# Patient Record
Sex: FEMALE | Race: BLACK OR AFRICAN AMERICAN | NOT HISPANIC OR LATINO | Employment: FULL TIME | URBAN - METROPOLITAN AREA
[De-identification: names, ages, dates, MRNs, and addresses within clinical notes are randomized per-mention and may not be internally consistent; named-entity substitution may affect disease eponyms.]

---

## 2017-01-09 ENCOUNTER — TELEPHONE (OUTPATIENT)
Dept: LACTATION | Facility: CLINIC | Age: 37
End: 2017-01-09

## 2017-01-09 NOTE — TELEPHONE ENCOUNTER
Lactation Note: patient called warmline with concern about baby's stools. States baby had meconium stool in the hospital and after discharge baby had not had any stools. Mother took baby to the pediatrician last Thursday (1/5/17), one week after discharge, and stated baby weighed 7-5 with sleeper, diaper, and onesie on. Birth weight was 6-14. Mother stated she expressed concern to doctor about baby's lack of stools and was advised to give the baby a glycerin suppository. After suppository given mother stated baby had a large green loose stool but has not stooled since then. Baby will be 2 weeks tomorrow. Baby has had no yellow stools since birth. She stated her milk came in, her breasts are full prior to feeding and softer after nursing, she hears the baby drinking and swallowing, he is content after nursing, and nurses at least 8 times in a 24 hour period. Reviewed with mother normal and expected stooling patterns - by day 5-6 baby should have 3-4 medium-large yellow seedy loose stools. Expressed concern to mother about baby's lack of stools and also suggested baby have true weight naked. Mother stated concern if baby is getting enough to eat.  Discussed pre and post feeding weight to determine if baby is transferring milk properly and reviewed signs of milk transfer. Mother has a pediatrician appointment at 3:00 pm today for follow up. Strongly encouraged patient to again express concerns to doctor and to call the warm line after appointment to report findings.

## 2017-01-10 ENCOUNTER — TELEPHONE (OUTPATIENT)
Dept: LACTATION | Facility: CLINIC | Age: 37
End: 2017-01-10

## 2017-01-11 ENCOUNTER — TELEPHONE (OUTPATIENT)
Dept: LACTATION | Facility: CLINIC | Age: 37
End: 2017-01-11

## 2017-01-11 NOTE — TELEPHONE ENCOUNTER
Lactation Note: Called mom to check on infant and breastfeeding. Mom states she took infant to Pediatrician on Monday (1/9) and expressed concern on the lack of bowel movement. Infant's weight in the Pediatrician's office was 7#5 oz with clothes. Mom states pediatrician ordered an x ray and told mom that nothing was wrong with baby's intestine. Infant had one bowel movement (yellow) on Monday after suppository use that was ordered by Pediatrician. Mom has been feeding infant at least 8 feedings a day. Infant will nurse on one side and mom has to stimulate infant to keep him awake during the feeding. Infant has had at least 5 to 6 wets in the last 24 hours and has not had any bowel movements since Monday. Mom also expressed concern on lack of breasts fullness. Agreed with mom that mom's milk supply might be diminishing. Also explained to mom that infant should have at this point 3 to 4 bowel movements a day. Based on information given, mom needs to work on increasing milk supply and watch for infant's output. Instructed mom to feed infant at least 8 or more in 24 hours. Offer both breasts. Mom to pump after feedings and feed infant pumped milk. Mom to watch for infant's output. Mom also needs to follow up with Pediatrician for a weight check, as infant needs to gain about 5 to 7 oz in a week. Mom verbalized understanding. Mom will call warm line for further questions or concerns.

## 2017-01-12 ENCOUNTER — TELEPHONE (OUTPATIENT)
Dept: LACTATION | Facility: CLINIC | Age: 37
End: 2017-01-12

## 2017-01-13 ENCOUNTER — TELEPHONE (OUTPATIENT)
Dept: LACTATION | Facility: CLINIC | Age: 37
End: 2017-01-13

## 2017-01-14 NOTE — TELEPHONE ENCOUNTER
"Follow up call. Pt states that she is pumping and bottle feeding. She will supplement with formula as needed. Pt gets one ounce with each pumping. Encouraged pt to pump 8-10 times per 24 hours. She is using a Medela PIS that she used for her other child, which she exclusively pumped for.   Suggested that she consider a new pump. Be sure all kit parts are intact.  suggests call her WIC office for potential breast pump , call Medicaid, Re need for pump.  Baby has now started stooling "on his own"   Encouraged mother to call  as needed.  "

## 2017-02-05 ENCOUNTER — HOSPITAL ENCOUNTER (EMERGENCY)
Facility: HOSPITAL | Age: 37
Discharge: HOME | End: 2017-02-05
Attending: EMERGENCY MEDICINE
Payer: COMMERCIAL

## 2017-02-05 VITALS
HEART RATE: 76 BPM | BODY MASS INDEX: 36.73 KG/M2 | HEIGHT: 69 IN | OXYGEN SATURATION: 99 % | TEMPERATURE: 99 F | DIASTOLIC BLOOD PRESSURE: 71 MMHG | SYSTOLIC BLOOD PRESSURE: 138 MMHG | WEIGHT: 248 LBS | RESPIRATION RATE: 18 BRPM

## 2017-02-05 DIAGNOSIS — B37.9 YEAST INFECTION: ICD-10-CM

## 2017-02-05 DIAGNOSIS — K59.00 CONSTIPATION, UNSPECIFIED CONSTIPATION TYPE: ICD-10-CM

## 2017-02-05 DIAGNOSIS — R30.0 DYSURIA: Primary | ICD-10-CM

## 2017-02-05 LAB
B-HCG UR QL: NEGATIVE
BACTERIA GENITAL QL WET PREP: ABNORMAL
BILIRUB UR QL STRIP: NEGATIVE
CLARITY UR REFRACT.AUTO: CLEAR
CLUE CELLS VAG QL WET PREP: ABNORMAL
COLOR UR AUTO: YELLOW
CTP QC/QA: YES
FILAMENT FUNGI VAG WET PREP-#/AREA: ABNORMAL
GLUCOSE UR QL STRIP: NEGATIVE
HGB UR QL STRIP: ABNORMAL
KETONES UR QL STRIP: NEGATIVE
LEUKOCYTE ESTERASE UR QL STRIP: ABNORMAL
MICROSCOPIC COMMENT: ABNORMAL
NITRITE UR QL STRIP: NEGATIVE
PH UR STRIP: 6 [PH] (ref 5–8)
PROT UR QL STRIP: NEGATIVE
RBC #/AREA URNS AUTO: 68 /HPF (ref 0–4)
SP GR UR STRIP: 1.01 (ref 1–1.03)
SPECIMEN SOURCE: ABNORMAL
SQUAMOUS #/AREA URNS AUTO: 4 /HPF
T VAGINALIS GENITAL QL WET PREP: ABNORMAL
URN SPEC COLLECT METH UR: ABNORMAL
UROBILINOGEN UR STRIP-ACNC: NEGATIVE EU/DL
WBC #/AREA URNS AUTO: 1 /HPF (ref 0–5)
WBC #/AREA VAG WET PREP: ABNORMAL
YEAST GENITAL QL WET PREP: ABNORMAL

## 2017-02-05 PROCEDURE — 87210 SMEAR WET MOUNT SALINE/INK: CPT

## 2017-02-05 PROCEDURE — 81025 URINE PREGNANCY TEST: CPT | Performed by: EMERGENCY MEDICINE

## 2017-02-05 PROCEDURE — 81001 URINALYSIS AUTO W/SCOPE: CPT

## 2017-02-05 PROCEDURE — 99284 EMERGENCY DEPT VISIT MOD MDM: CPT | Mod: ,,,

## 2017-02-05 PROCEDURE — 99284 EMERGENCY DEPT VISIT MOD MDM: CPT

## 2017-02-05 PROCEDURE — 87591 N.GONORRHOEAE DNA AMP PROB: CPT

## 2017-02-05 RX ORDER — FLUCONAZOLE 200 MG/1
200 TABLET ORAL DAILY
Qty: 7 TABLET | Refills: 0 | Status: SHIPPED | OUTPATIENT
Start: 2017-02-05 | End: 2017-02-09

## 2017-02-05 RX ORDER — HYDROCORTISONE 1 %
CREAM (GRAM) TOPICAL
Qty: 30 G | Refills: 0 | Status: SHIPPED | OUTPATIENT
Start: 2017-02-05 | End: 2018-10-21

## 2017-02-05 RX ORDER — CEPHALEXIN 500 MG/1
500 CAPSULE ORAL EVERY 12 HOURS
Qty: 14 CAPSULE | Refills: 0 | Status: SHIPPED | OUTPATIENT
Start: 2017-02-05 | End: 2017-02-09

## 2017-02-05 NOTE — ED AVS SNAPSHOT
[facility]  [address] [facility] [address]               Cesario Barrios   2017  8:27 PM   ED    Description:  Female : 1980   Department:  [facility]           Your Care was Coordinated By:     Provider Role From To    Daniella Villa MD Attending Provider 17    Lenard Garcia MD Attending Provider 17    Daniella Villa MD Attending Provider 17 --    Shani Goldstein PA-C Physician Assistant 17 --      Reason for Visit     Dysuria           Diagnoses this Visit        Comments    Dysuria    -  Primary     Constipation, unspecified constipation type         Yeast infection           ED Disposition     None           To Do List           Follow-up Information     Schedule an appointment as soon as possible for a visit with Yomi Cali MD.    Specialties:  Obstetrics, Obstetrics and Gynecology    Contact information:    [address] [place] [id number] [phone]         These Medications        Disp Refills Start End    cephALEXin (KEFLEX) 500 MG capsule 14 capsule 0 2017    Take 1 capsule (500 mg total) by mouth every 12 (twelve) hours. - Oral    [facility] [address] [place] [address]  [facility] Ph #: [phone]       hydrocortisone 1 % cream 30 g 0 2017 2/15/2017    Apply to affected area 2 times daily    [facility] [address] [place] [address] [facility] Ph #: [phone]       fluconazole (DIFLUCAN) 200 MG Tab 7 tablet 0 2017    Take 1 tablet (200 mg total) by mouth once daily. - Oral    Pharmacy: [facility] [address] [place] [address] [facility] Ph #: [phone]         [facility]     [facility] -  Assistance  Registered nurses  in the [facility] provide clinical advisement, health education, appointment booking, and other advisory services.  Call for this free service at [phone].             Medications           Message regarding Medications     Verify the changes and/or additions to your medication regime listed below are the same as discussed with your clinician today.  If any of these changes or additions are incorrect, please notify your healthcare provider.        START taking these NEW medications        Refills    cephALEXin (KEFLEX) 500 MG capsule 0    Sig: Take 1 capsule (500 mg total) by mouth every 12 (twelve) hours.    Class: Print    Route: Oral    hydrocortisone 1 % cream 0    Sig: Apply to affected area 2 times daily    Class: Print    fluconazole (DIFLUCAN) 200 MG Tab 0    Sig: Take 1 tablet (200 mg total) by mouth once daily.    Class: Print    Route: Oral           Verify that the below list of medications is an accurate representation of the medications you are currently taking.  If none reported, the list may be blank. If incorrect, please contact your healthcare provider. Carry this list with you in case of emergency.           Current Medications     cephALEXin (KEFLEX) 500 MG capsule Take 1 capsule (500 mg total) by mouth every 12 (twelve) hours.    fluconazole (DIFLUCAN) 200 MG Tab Take 1 tablet (200 mg total) by mouth once daily.    hydrocortisone 1 % cream Apply to affected area 2 times daily    ibuprofen (ADVIL,MOTRIN) 600 MG tablet Take 1 tablet (600 mg total) by mouth every 6 (six) hours.    oxycodone-acetaminophen (PERCOCET) 5-325 mg per tablet Take 1 tablet by mouth every 4 (four) hours as needed.    PNV64-iron cbn&gluc-FA-DSS-dha (CITRANATAL 90 DHA, NEW FORMULA) 90-1- mg Cmpk Take 1 tablet by mouth once daily.           Clinical Reference Information           Your Vitals Were     BP Pulse Temp Resp Height Weight    138/71 (BP Location: Right arm, Patient Position: Sitting) 76 99 °F  "(37.2 °C) (Oral) 18 5' 9" (1.753 m) 112.5 kg (248 lb)    Last Period SpO2 BMI          03/19/2016 (Approximate) 99% 36.62 kg/m2        Allergies as of 2/5/2017     No Known Allergies      Immunizations Administered on Date of Encounter - 2/5/2017     None      ED Micro, Lab, POCT     Start Ordered       Status Ordering Provider    02/05/17 2041 02/05/17 2040  Vaginal Screen Vagina  STAT      Final result     02/05/17 2041 02/05/17 2040  C. trachomatis/N. gonorrhoeae by AMP DNA Cervix  STAT      In process     02/05/17 2017 02/05/17 2016  POCT urine pregnancy  Once      Final result     02/05/17 2017 02/05/17 2016  Urine culture  Add-on      Completed     02/05/17 2016 02/05/17 2015  Urinalysis  STAT      Final result     02/05/17 2015 02/05/17 2015  Urinalysis Microscopic  Once      Final result       ED Imaging Orders     None        Discharge Instructions         Constipation (Adult)  Constipation means that you have bowel movements that are less frequent than usual. Stools often become very hard and difficult to pass.  Constipation is very common. At some point in life it affects almost everyone. Since everyone's bowel habits are different, what is constipation to one person may not be to another. Your healthcare provider may do tests to diagnose constipation. It depends on what he or she finds when evaluating you.    Symptoms of constipation include:  · Abdominal pain  · Bloating  · Vomiting  · Painful bowel movements  · Itching, swelling, bleeding, or pain around the anus  Causes  Constipation can have many causes. These include:  · Diet low in fiber  · Too much dairy  · Not drinking enough liquids  · Lack of exercise or physical activity. This is especially true for older adults.  · Changes in lifestyle or daily routine, including pregnancy, aging, work, and travel  · Frequent use or misuse of laxatives  · Ignoring the urge to have a bowel movement or delaying it until later  · Medicines, such as certain " prescription pain medicines, iron supplements, antacids, certain antidepressants, and calcium supplements  · Diseases like irritable bowel syndrome, bowel obstructions, stroke, diabetes, thyroid disease, Parkinson disease, hemorrhoids, and colon cancer  Complications  Potential complications of constipation can include:  · Hemorrhoids  · Rectal bleeding from hemorrhoids or anal fissures (skin tears)  · Hernias  · Dependency on laxatives  · Chronic constipation  · Fecal impaction  · Bowel obstruction or perforation  Home care  All treatment should be done after talking with your healthcare provider. This is especially true if you have another medical problems, are taking prescription medicines, or are an older adult. Treatment most often involves lifestyle changes. You may also need medicines. Your healthcare provider will tell you which will work best for you. Follow the advice below to help avoid this problem in the future.  Lifestyle changes  These lifestyle changes can help prevent constipation:  · Diet. Eat a high-fiber diet, with fresh fruit and vegetables, and reduce dairy intake, meats, and processed foods  · Fluids. It's important to get enough fluids each day. Drink plenty of water when you eat more fiber. If you are on diet that limits the amount of fluid you can have, talk about this with your healthcare provider.  · Regular exercise. Check with your healthcare provider first.  Medications  Take any medicines as directed. Some laxatives are safe to use only every now and then. Others can be taken on a regular basis. Talk with your doctor or pharmacist if you have questions.  Prescription pain medicines can cause constipation. If you are taking this kind of medicine, ask your healthcare provider if you should also take a stool softener.  Medicines you may take to treat constipation include:  · Fiber supplements  · Stool softeners  · Laxatives  · Enemas  · Rectal suppositories  Follow-up care  Follow up  with your healthcare provider if symptoms don't get better in the next few days. You may need to have more tests or see a specialist.  Call 911  Call 911 if any of these occur:  · Trouble breathing  · Stiff, rigid abdomen that is severely painful to touch  · Confusion  · Fainting or loss of consciousness  · Rapid heart rate  · Chest pain  When to seek medical advice  Call your healthcare provider right away if any of these occur:  · Fever over 100.4°F (38°C)  · Failure to resume normal bowel movements  · Pain in your abdomen or back gets worse  · Nausea or vomiting  · Swelling in your abdomen  · Blood in the stool  · Black, tarry stool  · Involuntary weight loss  · Weakness  Date Last Reviewed: 12/30/2015 © 2000-2016 [employer]. [address] [place] [address], [place] [address]. All rights reserved. This information is not intended as a substitute for professional medical care. Always follow your healthcare professional's instructions.          Eating a High-Fiber Diet  Fiber is what gives strength and structure to plants. Most grains, beans, vegetables, and fruits contain fiber. Foods rich in fiber are often low in calories and fat, and they fill you up more. They may also reduce your risks for certain health problems. To find out the amount of fiber in canned, packaged, or frozen foods, read the Nutrition Facts label. It tells you how much fiber is in a serving.    Types of fiber and their benefits  There are two types of fiber: insoluble and soluble. They both aid digestion and help you maintain a healthy weight.  · Insoluble fiber. This is found in whole grains, cereals, certain fruits and vegetables such as apple skin, corn, and carrots. Insoluble fiber may prevent constipation and reduce the risk for certain types of cancer.  · Soluble fiber. This type of fiber is in oats, beans, and certain fruits and vegetables such as strawberries and peas. Soluble fiber can reduce cholesterol, which may help lower  the risk for heart disease. It also helps control blood sugar levels.  Look for high-fiber foods  Try these foods to add fiber to your diet:  · Whole-grain breads and cereals. Try to eat 6 to 8 ounces a day. Include wheat and oat bran cereals, whole-wheat muffins or toast, and corn tortillas in your meals.  · Fruits. Try to eat 2 cups a day. Apples, oranges, strawberries, pears, and bananas are good sources. (Note: Fruit juice is low in fiber.)  · Vegetables. Try to eat at least 2.5 cups a day. Add asparagus, carrots, broccoli, peas, and corn to your meals.  · Beans. One cup of cooked lentils gives you over 15 grams of fiber. Try navy beans, lentils, and chickpeas.  · Seeds. A small handful of seeds gives you about 3 grams of fiber. Try sunflower seeds.  Keep track of your fiber  Keep track of how much fiber you eat. Start by reading food labels. Then eat a variety of foods high in fiber. As you begin to eat more fiber, ask your healthcare provider how much water you should be drinking to keep your digestive system working smoothly.  You should aim for a certain amount of fiber in your diet each day. If you are a woman, that amount is between 25 and 28 grams per day. Men should aim for 30 to 33 grams per day. After age 50, your daily fiber needs drop to 22 grams for women and 28 grams for men.  Before you reach for the fiber supplements, think about this. Fiber is found naturally in healthy whole foods. It gives you that feeling of fullness after you eat. Taking fiber supplements or eating fiber-enriched foods will not give you this full feeling.  Your fiber intake is a good measure for the quality of your overall diet. If you are missing out on your daily amount of fiber, you may be lacking other important nutrients as well.  Date Last Reviewed: 5/11/2015  © 8212-7515 [employer]. [address] [place] [address], [place] [address]. All rights reserved. This information is not intended as a substitute for  "professional medical care. Always follow your healthcare professional's instructions.          Dysuria     Painful urination (dysuria) is often caused by a problem in the urinary tract.   Dysuria is pain felt during urination. It is often described as a burning. Learn more about this problem and how it can be treated.  What causes dysuria?  Possible causes include:  · Infection with a bacteria or virus. This can be a urinary tract infection (UTI). Or it may be a sexually transmitted infection (STI).  · Sensitivity or allergy to chemicals. These chemicals are found in lotions and other products.  · Prostate or bladder problems  · Radiation therapy to the pelvic area  How is dysuria diagnosed?  Your healthcare provider will examine you. He or she will ask about your symptoms and health. After talking with you and doing a physical exam, your healthcare provider may know what is causing your dysuria. He or she will usually request  a sample of your urine. Tests of your urine, or a "urinalysis," are done. A urinalysis may include:  · Looking at the urine sample (visual exam)  · Checking for substances (chemical exam)  · Checking a small amount under a microscope (microscopic exam)  Some parts of the urinalysis may be done in the provider's office and some in a lab. And, the urine sample may be checked for bacteria and yeast (urine culture). Your healthcare provider will tell you more about these tests if they are needed.  How is dysuria treated?  Treatment depends on the cause. If you have a bacterial infection, you may need antibiotics. You may be given medications to make it easier for you to urinate and help relieve pain. Your healthcare provider can tell you more about your treatment options. Untreated, symptoms may get worse.  Call the healthcare provider right away if you have any of the following:  · Fever of 100.4°F (38°C) or higher   · No improvement after three days of treatment  · Trouble urinating because of " pain  · New or increased discharge from the vagina or penis  · Rash or joint pain  · Increased back or abdominal pain  · Enlarged painful lymph nodes (lumps) in the groin   Date Last Reviewed: 9/24/2014  © 3687-2042 [employer]. [address] [place] [address]. All rights reserved. This information is not intended as a substitute for professional medical care. Always follow your healthcare professional's instructions.          Anatomy of the Female Urinary Tract  Your urinary tract helps get rid of urine (your bodys liquid waste). The kidneys collect chemicals and water your body doesnt need. This is turned into urine. Urine travels out of the kidneys through the ureters to the bladder. The bladder holds urine until youre ready to release it. The urethra carries urine from the bladder out of the body. The main sphincter muscle circles the mid-urethra.      Front view of female urinary tract.     Date Last Reviewed: 8/27/2014  © 9126-9852 [employer]. [address] [place] [address]. All rights reserved. This information is not intended as a substitute for professional medical care. Always follow your healthcare professional's instructions.          Discharge References/Attachments     VAGINAL INFECTION: YEAST (CANDIDIASIS) (ENGLISH)      Your Scheduled Appointments     Feb 09, 2017 11:00 AM CST   Post Partum with Jocy Tejdaa MD   [facility] [address] [facility]    [address] [place] [id number]   [phone]               [facility] complies with applicable Federal civil rights laws and does not discriminate on the basis of race, color, national origin, age, disability, or sex.        Language Assistance Services     ATTENTION: Language assistance services are available, free of charge. Please call [phone].      ATENCIÓN: Si habla español, tiene a flores disposición servicios gratuitos de asistencia  lingüística. [facility] [phone].     HERNÁN Ý: N?u b?n nói Ti?ng Vi?t, có các d?ch v? h? tr? ngôn ng? mi?n phí dành cho b?n. G?i s? [phone].

## 2017-02-06 NOTE — ED TRIAGE NOTES
Pt delivered a child 5 weeks ago. Pt complains of constipation x4 days and burning during urination. Pt denies flu-like symptoms.

## 2017-02-06 NOTE — ED PROVIDER NOTES
Encounter Date: 2017    SCRIBE #1 NOTE: I, Trish Valdez, am scribing for, and in the presence of,  Dr. Villa. I have scribed the following portions of the note - the APC attestation.       History     Chief Complaint   Patient presents with    Dysuria     Pt reports delivering baby 5 weeks ago and now has painful urination. Denies blood. Also c/o constipation.     Review of patient's allergies indicates:  No Known Allergies  HPI Comments: 37yo AAF with medical history of anemia and chronic UTIs presents to ED complaining of burning with urination. Patient symptoms began 1 hour ago. She also noticed white and clear discharge when wiping. Patient reports constipation for 3 days. Has not tried anything at home, admits to poor diet. Patient denies nausea, vomiting and able to tolerate po intake. Patient denies fever, chills, nausea, vomit, chest pain, shortness of breath, abdominal pain, hematuria, changes in bowel, paresthesias.    The history is provided by the patient.     Past Medical History   Diagnosis Date    Anemia      borderline     Benign essential hypertension antepartum 2014    UTI (lower urinary tract infection)      Past Medical History Pertinent Negatives   Diagnosis Date Noted    Diabetes mellitus 5/3/2016    Seizures 5/3/2016     Past Surgical History   Procedure Laterality Date    Broken finger       section       Family History   Problem Relation Age of Onset    COPD Neg Hx     Drug abuse Neg Hx     Kidney disease Neg Hx     Learning disabilities Neg Hx     Mental illness Neg Hx     Breast cancer Neg Hx     Colon cancer Neg Hx     Ovarian cancer Neg Hx      Social History   Substance Use Topics    Smoking status: Never Smoker    Smokeless tobacco: None    Alcohol use No     Review of Systems   Constitutional: Negative for chills, fatigue and fever.   HENT: Negative for congestion, ear discharge and sore throat.    Eyes: Negative for visual disturbance.    Respiratory: Negative for cough, chest tightness and shortness of breath.    Cardiovascular: Negative for chest pain.   Gastrointestinal: Positive for blood in stool and constipation. Negative for abdominal pain, diarrhea, nausea and vomiting.   Genitourinary: Positive for dysuria and vaginal discharge. Negative for flank pain, frequency, hematuria and vaginal bleeding.   Musculoskeletal: Negative for back pain and neck pain.   Skin: Negative for rash.   Neurological: Negative for dizziness, syncope, weakness, light-headedness and headaches.   Psychiatric/Behavioral: The patient is nervous/anxious.        Physical Exam   Initial Vitals   BP Pulse Resp Temp SpO2   02/05/17 1959 02/05/17 1959 02/05/17 1959 02/05/17 1959 02/05/17 1959   135/89 84 16 98.3 °F (36.8 °C) 96 %     Physical Exam    Vitals reviewed.  Constitutional: Vital signs are normal. She appears well-developed.   HENT:   Head: Normocephalic and atraumatic.   Nose: Nose normal.   Eyes: Conjunctivae and lids are normal. Pupils are equal, round, and reactive to light. Lids are everted and swept, no foreign bodies found.   Neck: Trachea normal and normal range of motion. Neck supple.   Cardiovascular: Normal rate, regular rhythm, normal heart sounds, intact distal pulses and normal pulses.   No murmur heard.  Pulmonary/Chest: Breath sounds normal. No respiratory distress. She has no wheezes. She exhibits no tenderness.   Abdominal: Soft. Normal appearance and bowel sounds are normal. She exhibits no mass. There is no tenderness. There is no rebound.   Genitourinary: Rectal exam shows external hemorrhoid. Rectal exam shows no internal hemorrhoid, no fissure and no tenderness. Pelvic exam was performed with patient supine. No labial fusion. There is no rash, tenderness or lesion on the right labia. There is no rash, tenderness or lesion on the left labia. Cervix exhibits no motion tenderness and no friability. Right adnexum displays no mass, no tenderness  and no fullness. Left adnexum displays no mass, no tenderness and no fullness. Vaginal discharge found.   Neurological: She is alert. No sensory deficit.   Skin: Skin is warm and dry. No cyanosis.   Psychiatric: She has a normal mood and affect.         ED Course   Procedures  Labs Reviewed   URINALYSIS - Abnormal; Notable for the following:        Result Value    Occult Blood UA 3+ (*)     Leukocytes, UA Trace (*)     All other components within normal limits   VAGINAL SCREEN - Abnormal; Notable for the following:     Budding Yeast Rare (*)     Bacteria - Vaginal Screen Moderate (*)     All other components within normal limits   URINALYSIS MICROSCOPIC - Abnormal; Notable for the following:     RBC, UA 68 (*)     All other components within normal limits   CULTURE, URINE   C. TRACHOMATIS/N. GONORRHOEAE BY AMP DNA   POCT URINE PREGNANCY             Medical Decision Making:   History:   Old Medical Records: I decided to obtain old medical records.  Old Records Summarized: records from clinic visits.  Clinical Tests:   Lab Tests: Ordered and Reviewed       APC / Resident Notes:   37yo AAF with medical historyof anemia and chronic UTIs presents to ED complaining of burning with urination. Cardiac examination reveals regular rate and rhythm with no murmurs, lifts, heaves or thrills. Lungs clear bilaterally on auscultation, no wheezing. Abdomen is soft, non tender, non distended with normal bowel sounds x4. On vaginal exam, scant amount of white discharge noted in vagina. No CMT, no friability. On MICAH, internal hemorrhoid felt, no anal fissure, no active bleeding. Distal pulses intact. Sensory intact. Cap refill < 2 seconds. Vitals stable. Patient is in no acute distress.     While patient was in bathroom for urine, she had a hard bowel movement with trace amount of blood on tissue.     Labs reviewed. UA showed trace leukocytes, nitrite negative. UPT negative. Vaginal screen showed rare budding yeast.     DDX include but  not limited to UTI, constipation, internal hemorrhoid, STI, yeast infection.    Patient discharged home with Keflex 500mg, hydrocortisone cream 1%, diflucan 200mg and advised to use milk of magnesia or Miralax for constipation and softer stools. Advised to follow up with PCP. ED precautions given. Patient stable at discharge.     I have discussed and reviewed with my supervising physician.       Scribe Attestation:   Scribe #1: I performed the above scribed service and the documentation accurately describes the services I performed. I attest to the accuracy of the note.    Attending Attestation:     Physician Attestation Statement for NP/PA:   I discussed this assessment and plan of this patient with the NP/PA, but I did not personally examine the patient. The face to face encounter was performed by the NP/PA.    Other NP/PA Attestation Additions:    History of Present Illness: Dysuria         Physician Attestation for Scribe:  Physician Attestation Statement for Scribe #1: I, Dr. Villa, reviewed documentation, as scribed by Trish Valdez in my presence, and it is both accurate and complete.                 ED Course     Clinical Impression:   The primary encounter diagnosis was Dysuria. Diagnoses of Constipation, unspecified constipation type and Yeast infection were also pertinent to this visit.    Disposition:   Disposition: Discharged  Condition: Stable       Shani Goldstein PA-C  02/05/17 [id number]

## 2017-02-06 NOTE — DISCHARGE INSTRUCTIONS
Constipation (Adult)  Constipation means that you have bowel movements that are less frequent than usual. Stools often become very hard and difficult to pass.  Constipation is very common. At some point in life it affects almost everyone. Since everyone's bowel habits are different, what is constipation to one person may not be to another. Your healthcare provider may do tests to diagnose constipation. It depends on what he or she finds when evaluating you.    Symptoms of constipation include:  · Abdominal pain  · Bloating  · Vomiting  · Painful bowel movements  · Itching, swelling, bleeding, or pain around the anus  Causes  Constipation can have many causes. These include:  · Diet low in fiber  · Too much dairy  · Not drinking enough liquids  · Lack of exercise or physical activity. This is especially true for older adults.  · Changes in lifestyle or daily routine, including pregnancy, aging, work, and travel  · Frequent use or misuse of laxatives  · Ignoring the urge to have a bowel movement or delaying it until later  · Medicines, such as certain prescription pain medicines, iron supplements, antacids, certain antidepressants, and calcium supplements  · Diseases like irritable bowel syndrome, bowel obstructions, stroke, diabetes, thyroid disease, Parkinson disease, hemorrhoids, and colon cancer  Complications  Potential complications of constipation can include:  · Hemorrhoids  · Rectal bleeding from hemorrhoids or anal fissures (skin tears)  · Hernias  · Dependency on laxatives  · Chronic constipation  · Fecal impaction  · Bowel obstruction or perforation  Home care  All treatment should be done after talking with your healthcare provider. This is especially true if you have another medical problems, are taking prescription medicines, or are an older adult. Treatment most often involves lifestyle changes. You may also need medicines. Your healthcare provider will tell you which will work best for you. Follow  the advice below to help avoid this problem in the future.  Lifestyle changes  These lifestyle changes can help prevent constipation:  · Diet. Eat a high-fiber diet, with fresh fruit and vegetables, and reduce dairy intake, meats, and processed foods  · Fluids. It's important to get enough fluids each day. Drink plenty of water when you eat more fiber. If you are on diet that limits the amount of fluid you can have, talk about this with your healthcare provider.  · Regular exercise. Check with your healthcare provider first.  Medications  Take any medicines as directed. Some laxatives are safe to use only every now and then. Others can be taken on a regular basis. Talk with your doctor or pharmacist if you have questions.  Prescription pain medicines can cause constipation. If you are taking this kind of medicine, ask your healthcare provider if you should also take a stool softener.  Medicines you may take to treat constipation include:  · Fiber supplements  · Stool softeners  · Laxatives  · Enemas  · Rectal suppositories  Follow-up care  Follow up with your healthcare provider if symptoms don't get better in the next few days. You may need to have more tests or see a specialist.  Call 911  Call 911 if any of these occur:  · Trouble breathing  · Stiff, rigid abdomen that is severely painful to touch  · Confusion  · Fainting or loss of consciousness  · Rapid heart rate  · Chest pain  When to seek medical advice  Call your healthcare provider right away if any of these occur:  · Fever over 100.4°F (38°C)  · Failure to resume normal bowel movements  · Pain in your abdomen or back gets worse  · Nausea or vomiting  · Swelling in your abdomen  · Blood in the stool  · Black, tarry stool  · Involuntary weight loss  · Weakness  Date Last Reviewed: 12/30/2015  © 6525-1752 [employer]. [address] [place] [address], [place] [address]. All rights reserved. This information is not intended as a substitute for  professional medical care. Always follow your healthcare professional's instructions.          Eating a High-Fiber Diet  Fiber is what gives strength and structure to plants. Most grains, beans, vegetables, and fruits contain fiber. Foods rich in fiber are often low in calories and fat, and they fill you up more. They may also reduce your risks for certain health problems. To find out the amount of fiber in canned, packaged, or frozen foods, read the Nutrition Facts label. It tells you how much fiber is in a serving.    Types of fiber and their benefits  There are two types of fiber: insoluble and soluble. They both aid digestion and help you maintain a healthy weight.  · Insoluble fiber. This is found in whole grains, cereals, certain fruits and vegetables such as apple skin, corn, and carrots. Insoluble fiber may prevent constipation and reduce the risk for certain types of cancer.  · Soluble fiber. This type of fiber is in oats, beans, and certain fruits and vegetables such as strawberries and peas. Soluble fiber can reduce cholesterol, which may help lower the risk for heart disease. It also helps control blood sugar levels.  Look for high-fiber foods  Try these foods to add fiber to your diet:  · Whole-grain breads and cereals. Try to eat 6 to 8 ounces a day. Include wheat and oat bran cereals, whole-wheat muffins or toast, and corn tortillas in your meals.  · Fruits. Try to eat 2 cups a day. Apples, oranges, strawberries, pears, and bananas are good sources. (Note: Fruit juice is low in fiber.)  · Vegetables. Try to eat at least 2.5 cups a day. Add asparagus, carrots, broccoli, peas, and corn to your meals.  · Beans. One cup of cooked lentils gives you over 15 grams of fiber. Try navy beans, lentils, and chickpeas.  · Seeds. A small handful of seeds gives you about 3 grams of fiber. Try sunflower seeds.  Keep track of your fiber  Keep track of how much fiber you eat. Start by reading food labels. Then eat a  variety of foods high in fiber. As you begin to eat more fiber, ask your healthcare provider how much water you should be drinking to keep your digestive system working smoothly.  You should aim for a certain amount of fiber in your diet each day. If you are a woman, that amount is between 25 and 28 grams per day. Men should aim for 30 to 33 grams per day. After age 50, your daily fiber needs drop to 22 grams for women and 28 grams for men.  Before you reach for the fiber supplements, think about this. Fiber is found naturally in healthy whole foods. It gives you that feeling of fullness after you eat. Taking fiber supplements or eating fiber-enriched foods will not give you this full feeling.  Your fiber intake is a good measure for the quality of your overall diet. If you are missing out on your daily amount of fiber, you may be lacking other important nutrients as well.  Date Last Reviewed: 5/11/2015  © 5488-5482 [employer]. [address] [place] [address]. All rights reserved. This information is not intended as a substitute for professional medical care. Always follow your healthcare professional's instructions.          Dysuria     Painful urination (dysuria) is often caused by a problem in the urinary tract.   Dysuria is pain felt during urination. It is often described as a burning. Learn more about this problem and how it can be treated.  What causes dysuria?  Possible causes include:  · Infection with a bacteria or virus. This can be a urinary tract infection (UTI). Or it may be a sexually transmitted infection (STI).  · Sensitivity or allergy to chemicals. These chemicals are found in lotions and other products.  · Prostate or bladder problems  · Radiation therapy to the pelvic area  How is dysuria diagnosed?  Your healthcare provider will examine you. He or she will ask about your symptoms and health. After talking with you and doing a physical exam, your healthcare provider may  "know what is causing your dysuria. He or she will usually request  a sample of your urine. Tests of your urine, or a "urinalysis," are done. A urinalysis may include:  · Looking at the urine sample (visual exam)  · Checking for substances (chemical exam)  · Checking a small amount under a microscope (microscopic exam)  Some parts of the urinalysis may be done in the provider's office and some in a lab. And, the urine sample may be checked for bacteria and yeast (urine culture). Your healthcare provider will tell you more about these tests if they are needed.  How is dysuria treated?  Treatment depends on the cause. If you have a bacterial infection, you may need antibiotics. You may be given medications to make it easier for you to urinate and help relieve pain. Your healthcare provider can tell you more about your treatment options. Untreated, symptoms may get worse.  Call the healthcare provider right away if you have any of the following:  · Fever of 100.4°F (38°C) or higher   · No improvement after three days of treatment  · Trouble urinating because of pain  · New or increased discharge from the vagina or penis  · Rash or joint pain  · Increased back or abdominal pain  · Enlarged painful lymph nodes (lumps) in the groin   Date Last Reviewed: 9/24/2014  © 4558-3519 [employer]. [address] [place] [address]. All rights reserved. This information is not intended as a substitute for professional medical care. Always follow your healthcare professional's instructions.          Anatomy of the Female Urinary Tract  Your urinary tract helps get rid of urine (your bodys liquid waste). The kidneys collect chemicals and water your body doesnt need. This is turned into urine. Urine travels out of the kidneys through the ureters to the bladder. The bladder holds urine until youre ready to release it. The urethra carries urine from the bladder out of the body. The main sphincter muscle circles " the mid-urethra.      Front view of female urinary tract.     Date Last Reviewed: 8/27/2014  © 7875-9663 [employer]. [address] [place] [address], [place] [address]. All rights reserved. This information is not intended as a substitute for professional medical care. Always follow your healthcare professional's instructions.

## 2017-02-06 NOTE — ED NOTES
Patient identifiers verified and correct for Cesario Barrios.   LOC: The patient is awake, alert and aware of environment with an appropriate affect, the patient is oriented x 3 and speaking appropriately.   APPEARANCE: Patient appears comfortable and in no acute distress, patient is clean and well groomed.  SKIN: The skin is warm and dry, color consistent with ethnicity, patient has normal skin turgor and moist mucus membranes, skin intact, no breakdown or bruising noted.   MUSCULOSKELETAL: Patient moving all extremities spontaneously, no swelling noted.  RESPIRATORY: Airway is open and patent, respirations are spontaneous, patient has a normal effort and rate, no accessory muscle use noted, pt placed on continuous pulse ox with O2 sats noted at 97% on room air.  CARDIAC: Pt placed on cardiac monitor. Patient has a normal rate and regular rhythm, no edema noted, capillary refill < 3 seconds.   GASTRO: Soft and non tender to palpation, no distention noted, normoactive bowel sounds present in all four quadrants.   : Pt denies any pain or frequency with urination.  NEURO: Pt opens eyes spontaneously, behavior appropriate to situation, follows commands, facial expression symmetrical, bilateral hand grasp equal and even, purposeful motor response noted, normal sensation in all extremities when touched with a finger.

## 2017-02-07 LAB
C TRACH DNA SPEC QL NAA+PROBE: NEGATIVE
N GONORRHOEA DNA SPEC QL NAA+PROBE: NEGATIVE

## 2017-02-09 ENCOUNTER — POSTPARTUM VISIT (OUTPATIENT)
Dept: OBSTETRICS AND GYNECOLOGY | Facility: CLINIC | Age: 37
End: 2017-02-09
Payer: COMMERCIAL

## 2017-02-09 VITALS — DIASTOLIC BLOOD PRESSURE: 70 MMHG | SYSTOLIC BLOOD PRESSURE: 120 MMHG | BODY MASS INDEX: 33.37 KG/M2 | WEIGHT: 226 LBS

## 2017-02-09 DIAGNOSIS — Z30.430 ENCOUNTER FOR INSERTION OF INTRAUTERINE CONTRACEPTIVE DEVICE: ICD-10-CM

## 2017-02-09 PROCEDURE — 0503F POSTPARTUM CARE VISIT: CPT | Mod: S$GLB,,, | Performed by: OBSTETRICS & GYNECOLOGY

## 2017-02-09 PROCEDURE — 58300 INSERT INTRAUTERINE DEVICE: CPT | Mod: PBBFAC | Performed by: OBSTETRICS & GYNECOLOGY

## 2017-02-09 PROCEDURE — 99212 OFFICE O/P EST SF 10 MIN: CPT | Mod: PBBFAC | Performed by: OBSTETRICS & GYNECOLOGY

## 2017-02-09 PROCEDURE — 58300 INSERT INTRAUTERINE DEVICE: CPT | Mod: S$GLB,,, | Performed by: OBSTETRICS & GYNECOLOGY

## 2017-02-09 PROCEDURE — 99999 PR PBB SHADOW E&M-EST. PATIENT-LVL II: CPT | Mod: PBBFAC,,, | Performed by: OBSTETRICS & GYNECOLOGY

## 2017-02-09 RX ADMIN — LEVONORGESTREL 1 EACH: 52 INTRAUTERINE DEVICE INTRAUTERINE at 04:02

## 2017-02-09 NOTE — MR AVS SNAPSHOT
[facility] [address]  [address]  [facility] [address]  Phone: [phone]  Fax: [phone]                  Cesario Barrios   2017 11:00 AM   Postpartum Visit    Description:  Female : 1980   Provider:  Jocy Tejada MD   Department:  [facility] [address]           Reason for Visit     Procedure                To Do List           Goals (5 Years of Data)     None      [facility]     [facility] -  Assistance  Registered nurses in the [facility] Call Center provide clinical advisement, health education, appointment booking, and other advisory services.  Call for this free service at [phone].             Medications           Message regarding Medications     Verify the changes and/or additions to your medication regime listed below are the same as discussed with your clinician today.  If any of these changes or additions are incorrect, please notify your healthcare provider.        STOP taking these medications     cephALEXin (KEFLEX) 500 MG capsule Take 1 capsule (500 mg total) by mouth every 12 (twelve) hours.    fluconazole (DIFLUCAN) 200 MG Tab Take 1 tablet (200 mg total) by mouth once daily.    ibuprofen (ADVIL,MOTRIN) 600 MG tablet Take 1 tablet (600 mg total) by mouth every 6 (six) hours.    oxycodone-acetaminophen (PERCOCET) 5-325 mg per tablet Take 1 tablet by mouth every 4 (four) hours as needed.           Verify that the below list of medications is an accurate representation of the medications you are currently taking.  If none reported, the list may be blank. If incorrect, please contact your healthcare provider. Carry this list with you in case of emergency.           Current Medications     hydrocortisone 1 % cream Apply to affected area 2 times daily    PNV64-iron cbn&gluc-FA-DSS-dha (CITRANATAL 90 DHA, NEW FORMULA) 90-1- mg Cmpk Take 1 tablet by mouth once daily.           Clinical Reference  Information           Prenatal Vitals     Enc. Date GA Prenatal Vitals Prenatal Pulse Pain Level Urine Albumin/Glucose Edema Presentation Dilation/Effacement/Station    17 40w1d 120/70 / 102.5 kg (225 lb 15.5 oz)           16 40w1d Admission Dx: Normal labor Dept: [facility]    16 40w0d Admission Dept: [facility] OB ER    16 39w1d 124/72 / 113 kg (249 lb 1.9 oz)  / present / Present  0 Negative / Negative None / None / None   60 / -3    16 38w0d 122/72 / 110.8 kg (244 lb 4.3 oz)  / +++ / Present  3  None / None / None / No   / -3    16 37w0d 122/80 / 113.3 kg (249 lb 12.5 oz)  / +++ / Present  3 Negative / Negative None / None / None / No      16 36w1d 120/74 / 113.4 kg (250 lb) 37 cm / 140 / Present  0 Negative / Negative None / None / None / No  0    16 33w1d 112/79 / 113.4 kg (250 lb)  / +++ / Present  0 Trace / Negative None / None / None / No      10/20/16 30w3d 108/72 / 113.5 kg (250 lb 3.6 oz) 33 cm / 145 / Present  0 Negative / Negative None / None / None / No      10/16/16 29w6d Admission Dx: Decreased fetal movement Dept: [facility] OB ASSE    16 27w3d 120/72 / 113.3 kg (249 lb 12.5 oz)  / +++  0 Negative / Negative None / None / None / No      16 24w3d 120/70 / 112.6 kg (248 lb 3.8 oz)  / +++ / Present  0 Negative / Negative None / None / None / No      16 22w1d 112/72 / 111.1 kg (244 lb 14.9 oz)           16 20w3d 110/76 / 110.6 kg (243 lb 13.3 oz)  / +++ / Present  3* Negative / Negative None / None / None / No                     *Pain Level:  mild cramps    16 19w1d 112/80 / 111.2 kg (245 lb 2.4 oz)           16 17w5d Admission Dx: Hx of  delivery, currently pregnant Dept: [facility] L&D    16 16w3d 108/80 / 111.6 kg (246 lb 0.5 oz)  / +++ / Absent  0 Negative / Negative None / None / None / No      16 16w1d 122/72 / 110.1 kg (242 lb 11.6 oz)           16 12w0d 118/76 / 110 kg (242 lb 8.1 oz)   0 Negative / Negative  "None / None / None / No      16 6w3d 114/70 / 111.3 kg (245 lb 6 oz)  /  / Absent  0 Negative / Negative None / None / None / No         TW.814 kg (4 lb)   Pregravid weight: 111.1 kg (245 lb)   Number of babies: 1   Height: 5' 9" (1.753 m)   BMI: 36.2       Your Vitals Were     BP Weight Last Period BMI       120/70 102.5 kg (225 lb 15.5 oz) 2016 (Approximate) 33.37 kg/m2       Allergies as of 2017     No Known Allergies      Immunizations Administered on Date of Encounter - 2017     None      Language Assistance Services     ATTENTION: Language assistance services are available, free of charge. Please call [phone].      ATENCIÓN: Si alessandrola vesta, tiene a flores disposición servicios gratuitos de asistencia lingüística. Llame al [phone].     CHÚ Ý: N?u b?n nói Ti?ng Vi?t, có các d?ch v? h? tr? ngôn ng? mi?n phí dành cho b?n. G?i s? [phone].         [facility] [address] complies with applicable Federal civil rights laws and does not discriminate on the basis of race, color, national origin, age, disability, or sex.        "

## 2017-02-09 NOTE — PROGRESS NOTES
CC: Post-partum follow-up    Cesario Barrios is a 36 y.o. female  presents for post-partum visit s/p a .    Delivery Date: 2016  Delivery MD: Jocy Tejada  Gender: male  Birth Weight: 6 pounds 13 ounces  Breast Feeding: NO  Depression: NO  Contraception: IUD    Pregnancy was complicated by: hx PTL/PPROM on progesterone injections.       Visit Vitals    /70    Wt 102.5 kg (225 lb 15.5 oz)    LMP 2016 (Approximate)    BMI 33.37 kg/m2       ROS:  GENERAL: No fever, chills, fatigability or weight loss.  VULVAR: No pain, no lesions and no itching.  VAGINAL: No relaxation, no itching, no discharge, no abnormal bleeding and no lesions.  ABDOMEN: No abdominal pain. Denies nausea. Denies vomiting. No diarrhea. No constipation  BREAST: Denies pain. No lumps. No discharge.  URINARY: No incontinence, no nocturia, no frequency and no dysuria.  CARDIOVASCULAR: No chest pain. No shortness of breath. No leg cramps.  NEUROLOGICAL: No headaches. No vision changes.    PHYSICAL EXAM:  PELVIC: EGBUS within normal limits, normal vagina and vulva    PROCEDURES:  - IUD insertion    PROCEDURE:  Mirena insertion    INDICATION: Contraception    PATIENT CONSENT: She was counseled on the risks, benefits, indications, and alternatives to IUD use.  She understands that with insertion there is a risk of bleeding, infection, and uterine perforation.  All questions are answered.  Consents signed.     LABS: UPT is negative.     Cervical cultures were not performed.    ANESTHESIA: NONE    PROCEDURE NOTE:    Time out performed.  The cervix was visualized with a speculum.  A single tooth tenaculum was placed on the anterior lip of the cervix.  The uterus sounds to 10cm using sterile technique.  A Mirena was loaded and placed high in the uterine fundus without difficulty using sterile technique.  The string was was then cut.  The tenaculum and speculum were removed.    COMPLICATIONS: None    PATIENT  DISPOSITION: The patient tolerated the procedure well.    Post IUD placement counseling:  Manage post IUD placement pain with NSAIDS, Tylenol or Rx per [facility].  IUD danger signs and how to check for strings were discussed.  The IUD needs to be removed in 5 years for Mirena and 10 years for Copper IUD.    Follow up:  4 weeks for string check      Jocy Tejada MD 02/09/2017 4:34 PM              Diagnosis:  1. Routine postpartum follow-up    2. Encounter for insertion of intrauterine contraceptive device        Plan:     Orders Placed This Encounter    levonorgestrel 20 mcg/24 hr (5 years) IUD 1 each         Patient was counseled today on A.C.S. Pap guidelines and recommendations for yearly pelvic exams and monthly self breast exams; to see her PCP for other health maintenance.    FOLLOW UP: in 4 weeks for string check.

## 2018-04-15 ENCOUNTER — HOSPITAL ENCOUNTER (EMERGENCY)
Facility: HOSPITAL | Age: 38
Discharge: HOME | End: 2018-04-15
Attending: EMERGENCY MEDICINE
Payer: MEDICAID

## 2018-04-15 VITALS
DIASTOLIC BLOOD PRESSURE: 91 MMHG | HEART RATE: 73 BPM | RESPIRATION RATE: 18 BRPM | BODY MASS INDEX: 37.92 KG/M2 | TEMPERATURE: 98 F | OXYGEN SATURATION: 99 % | SYSTOLIC BLOOD PRESSURE: 155 MMHG | WEIGHT: 256 LBS | HEIGHT: 69 IN

## 2018-04-15 DIAGNOSIS — N20.0 NEPHROLITHIASIS: Primary | ICD-10-CM

## 2018-04-15 LAB
ALBUMIN SERPL BCP-MCNC: 4 G/DL
ALP SERPL-CCNC: 51 U/L
ALT SERPL W/O P-5'-P-CCNC: 13 U/L
ANION GAP SERPL CALC-SCNC: 10 MMOL/L
AST SERPL-CCNC: 16 U/L
B-HCG UR QL: NEGATIVE
BACTERIA #/AREA URNS AUTO: ABNORMAL /HPF
BASOPHILS # BLD AUTO: 0.02 K/UL
BASOPHILS NFR BLD: 0.2 %
BILIRUB SERPL-MCNC: 0.6 MG/DL
BILIRUB UR QL STRIP: NEGATIVE
BUN SERPL-MCNC: 10 MG/DL
CALCIUM SERPL-MCNC: 9.4 MG/DL
CHLORIDE SERPL-SCNC: 107 MMOL/L
CLARITY UR REFRACT.AUTO: ABNORMAL
CO2 SERPL-SCNC: 22 MMOL/L
COLOR UR AUTO: YELLOW
CREAT SERPL-MCNC: 1 MG/DL
CTP QC/QA: YES
DIFFERENTIAL METHOD: ABNORMAL
EOSINOPHIL # BLD AUTO: 0 K/UL
EOSINOPHIL NFR BLD: 0.4 %
ERYTHROCYTE [DISTWIDTH] IN BLOOD BY AUTOMATED COUNT: 13.2 %
EST. GFR  (AFRICAN AMERICAN): >60 ML/MIN/1.73 M^2
EST. GFR  (NON AFRICAN AMERICAN): >60 ML/MIN/1.73 M^2
GLUCOSE SERPL-MCNC: 146 MG/DL
GLUCOSE UR QL STRIP: NEGATIVE
HCT VFR BLD AUTO: 38.8 %
HGB BLD-MCNC: 12.7 G/DL
HGB UR QL STRIP: ABNORMAL
HYALINE CASTS UR QL AUTO: 0 /LPF
IMM GRANULOCYTES # BLD AUTO: 0.07 K/UL
IMM GRANULOCYTES NFR BLD AUTO: 0.7 %
KETONES UR QL STRIP: NEGATIVE
LEUKOCYTE ESTERASE UR QL STRIP: ABNORMAL
LYMPHOCYTES # BLD AUTO: 2.1 K/UL
LYMPHOCYTES NFR BLD: 20.6 %
MCH RBC QN AUTO: 28.9 PG
MCHC RBC AUTO-ENTMCNC: 32.7 G/DL
MCV RBC AUTO: 88 FL
MICROSCOPIC COMMENT: ABNORMAL
MONOCYTES # BLD AUTO: 0.4 K/UL
MONOCYTES NFR BLD: 4.3 %
NEUTROPHILS # BLD AUTO: 7.4 K/UL
NEUTROPHILS NFR BLD: 73.8 %
NITRITE UR QL STRIP: NEGATIVE
NRBC BLD-RTO: 0 /100 WBC
PH UR STRIP: 5 [PH] (ref 5–8)
PLATELET # BLD AUTO: 212 K/UL
PMV BLD AUTO: 11 FL
POTASSIUM SERPL-SCNC: 3.7 MMOL/L
PROT SERPL-MCNC: 7.5 G/DL
PROT UR QL STRIP: ABNORMAL
RBC # BLD AUTO: 4.4 M/UL
RBC #/AREA URNS AUTO: 9 /HPF (ref 0–4)
SODIUM SERPL-SCNC: 139 MMOL/L
SP GR UR STRIP: 1.02 (ref 1–1.03)
SQUAMOUS #/AREA URNS AUTO: 56 /HPF
URN SPEC COLLECT METH UR: ABNORMAL
UROBILINOGEN UR STRIP-ACNC: NEGATIVE EU/DL
WBC # BLD AUTO: 10 K/UL
WBC #/AREA URNS AUTO: 21 /HPF (ref 0–5)

## 2018-04-15 PROCEDURE — 96376 TX/PRO/DX INJ SAME DRUG ADON: CPT

## 2018-04-15 PROCEDURE — 96374 THER/PROPH/DIAG INJ IV PUSH: CPT

## 2018-04-15 PROCEDURE — 81025 URINE PREGNANCY TEST: CPT | Performed by: EMERGENCY MEDICINE

## 2018-04-15 PROCEDURE — 25000003 PHARM REV CODE 250: Performed by: EMERGENCY MEDICINE

## 2018-04-15 PROCEDURE — 87086 URINE CULTURE/COLONY COUNT: CPT

## 2018-04-15 PROCEDURE — 96375 TX/PRO/DX INJ NEW DRUG ADDON: CPT

## 2018-04-15 PROCEDURE — 96361 HYDRATE IV INFUSION ADD-ON: CPT

## 2018-04-15 PROCEDURE — 99285 EMERGENCY DEPT VISIT HI MDM: CPT | Mod: ,,, | Performed by: EMERGENCY MEDICINE

## 2018-04-15 PROCEDURE — 80053 COMPREHEN METABOLIC PANEL: CPT

## 2018-04-15 PROCEDURE — 85025 COMPLETE CBC W/AUTO DIFF WBC: CPT

## 2018-04-15 PROCEDURE — 81001 URINALYSIS AUTO W/SCOPE: CPT

## 2018-04-15 PROCEDURE — 63600175 PHARM REV CODE 636 W HCPCS: Performed by: EMERGENCY MEDICINE

## 2018-04-15 PROCEDURE — 99284 EMERGENCY DEPT VISIT MOD MDM: CPT | Mod: 25

## 2018-04-15 RX ORDER — ACETAMINOPHEN 500 MG
1000 TABLET ORAL
Status: COMPLETED | OUTPATIENT
Start: 2018-04-15 | End: 2018-04-15

## 2018-04-15 RX ORDER — FENTANYL CITRATE 50 UG/ML
25 INJECTION, SOLUTION INTRAMUSCULAR; INTRAVENOUS
Status: COMPLETED | OUTPATIENT
Start: 2018-04-15 | End: 2018-04-15

## 2018-04-15 RX ORDER — ONDANSETRON 4 MG/1
4 TABLET, ORALLY DISINTEGRATING ORAL EVERY 6 HOURS PRN
Qty: 16 TABLET | Refills: 0 | Status: SHIPPED | OUTPATIENT
Start: 2018-04-15 | End: 2018-04-19

## 2018-04-15 RX ORDER — ACETAMINOPHEN 500 MG
500 TABLET ORAL EVERY 6 HOURS PRN
Qty: 30 TABLET | Refills: 0 | Status: SHIPPED | OUTPATIENT
Start: 2018-04-15 | End: 2018-04-22

## 2018-04-15 RX ORDER — IBUPROFEN 400 MG/1
400 TABLET ORAL EVERY 6 HOURS PRN
Qty: 30 TABLET | Refills: 0 | Status: SHIPPED | OUTPATIENT
Start: 2018-04-15 | End: 2018-04-22

## 2018-04-15 RX ORDER — KETOROLAC TROMETHAMINE 30 MG/ML
10 INJECTION, SOLUTION INTRAMUSCULAR; INTRAVENOUS
Status: COMPLETED | OUTPATIENT
Start: 2018-04-15 | End: 2018-04-15

## 2018-04-15 RX ORDER — TAMSULOSIN HYDROCHLORIDE 0.4 MG/1
0.4 CAPSULE ORAL DAILY
Qty: 10 CAPSULE | Refills: 0 | Status: SHIPPED | OUTPATIENT
Start: 2018-04-15 | End: 2018-10-21

## 2018-04-15 RX ORDER — OXYCODONE HYDROCHLORIDE 5 MG/1
5 CAPSULE ORAL EVERY 4 HOURS PRN
Qty: 15 CAPSULE | Refills: 0 | Status: SHIPPED | OUTPATIENT
Start: 2018-04-15 | End: 2018-10-21

## 2018-04-15 RX ORDER — PROCHLORPERAZINE EDISYLATE 5 MG/ML
10 INJECTION INTRAMUSCULAR; INTRAVENOUS ONCE
Status: COMPLETED | OUTPATIENT
Start: 2018-04-15 | End: 2018-04-15

## 2018-04-15 RX ADMIN — SODIUM CHLORIDE 1000 ML: 0.9 INJECTION, SOLUTION INTRAVENOUS at 01:04

## 2018-04-15 RX ADMIN — FENTANYL CITRATE 25 MCG: 50 INJECTION, SOLUTION INTRAMUSCULAR; INTRAVENOUS at 11:04

## 2018-04-15 RX ADMIN — ACETAMINOPHEN 1000 MG: 500 TABLET, FILM COATED ORAL at 11:04

## 2018-04-15 RX ADMIN — KETOROLAC TROMETHAMINE 10 MG: 30 INJECTION, SOLUTION INTRAMUSCULAR at 12:04

## 2018-04-15 RX ADMIN — FENTANYL CITRATE 25 MCG: 50 INJECTION, SOLUTION INTRAMUSCULAR; INTRAVENOUS at 01:04

## 2018-04-15 RX ADMIN — PROCHLORPERAZINE EDISYLATE 10 MG: 5 INJECTION INTRAMUSCULAR; INTRAVENOUS at 01:04

## 2018-04-15 NOTE — ED PROVIDER NOTES
SCRIBE #1 NOTE: I, Santana Lama, am scribing for, and in the presence of,  Dr. Martin. I have scribed the entire note.         CC: Abdominal Pain (RUQ pain with SOB and NV)      HPI: Cesario Barrios is a 37 y.o. year old female who presents to the ED complaining of constant right flank pain.   Pt states right flank pain began this morning and woke her from sleep. She notes an associated RUQ abdominal pain and vomiting(secondary to pain). Pt denies any history of kidney stones or gallstones. No fever.       Past Medical History:   Diagnosis Date    Anemia     borderline     Benign essential hypertension antepartum 2014    UTI (lower urinary tract infection)      Past Surgical History:   Procedure Laterality Date    broken finger       SECTION       Family History   Problem Relation Age of Onset    COPD Neg Hx     Drug abuse Neg Hx     Kidney disease Neg Hx     Learning disabilities Neg Hx     Mental illness Neg Hx     Breast cancer Neg Hx     Colon cancer Neg Hx     Ovarian cancer Neg Hx      No current facility-administered medications on file prior to encounter.      Current Outpatient Prescriptions on File Prior to Encounter   Medication Sig Dispense Refill    hydrocortisone 1 % cream Apply to affected area 2 times daily 30 g 0    PNV64-iron cbn&gluc-FA-DSS-dha (CITRANATAL 90 DHA, NEW FORMULA) 90-1- mg Cmpk Take 1 tablet by mouth once daily. 30 each 11     Patient has no known allergies.  Social History     Social History    Marital status:      Spouse name: N/A    Number of children: N/A    Years of education: N/A     Social History Main Topics    Smoking status: Never Smoker    Smokeless tobacco: Never Used    Alcohol use No    Drug use: No    Sexual activity: Yes     Partners: Male     Birth control/ protection: None     Other Topics Concern    None     Social History Narrative    None       ROS:     Constitutional : neg  HEENT neg  Resp neg  Cardiac   neg  GI positive for vomiting and abdominal pain   positive for flank pain  Neuro neg  Heme/Immune: neg  Endo neg  Skin neg    PHYSICAL EXAM:  Vitals:    04/15/18 1310   BP: (!) 155/91   Pulse: 73   Resp: 18   Temp: 98.4 °F (36.9 °C)         PHYSICAL EXAM:   general: uncomfortable in pain, in acute distress  VS: triage VS reviewed  HEENT: NC/AT, PERRL, EOMI  Neck: trachea midline  CV: RRR, no m/r/g, no LE pitting edema  Resp: CTAB  ABD:  ND, + normal BS, RUQ tenderness   Renal: right CVAT   Neuro: AAO x 3, 5/5 muscle strength in upper and lower extremities, sensation grossly intact, face symmetric, speech normal  Ext: no deformity, +2 symmetrical peripheral pulses          DATA & INTERVENTIONS:    LABS reviewed:  Labs Reviewed   CBC W/ AUTO DIFFERENTIAL - Abnormal; Notable for the following:        Result Value    Immature Granulocytes 0.7 (*)     Immature Grans (Abs) 0.07 (*)     Gran% 73.8 (*)     All other components within normal limits   COMPREHENSIVE METABOLIC PANEL - Abnormal; Notable for the following:     CO2 22 (*)     Glucose 146 (*)     Alkaline Phosphatase 51 (*)     All other components within normal limits   URINALYSIS, REFLEX TO URINE CULTURE - Abnormal; Notable for the following:     Appearance, UA Cloudy (*)     Protein, UA 2+ (*)     Occult Blood UA 2+ (*)     Leukocytes, UA 3+ (*)     All other components within normal limits    Narrative:     Preferred Collection Type->Urine, Clean Catch   URINALYSIS MICROSCOPIC - Abnormal; Notable for the following:     RBC, UA 9 (*)     WBC, UA 21 (*)     Bacteria, UA Many (*)     All other components within normal limits    Narrative:     Preferred Collection Type->Urine, Clean Catch   CULTURE, URINE   POCT URINE PREGNANCY       RADIOLOGY reviewed:  Imaging Results          CT Renal Stone Study ABD Pelvis WO (Final result)  Result time 04/15/18 12:15:40    Final result by Ramon Love DO (04/15/18 12:15:40)                 Impression:      Right-sided  obstructive uropathy with mild hydroureteronephrosis up to a 3-4 mm calcified stone in the right UVJ.  Please note there is prominent induration and probable fluid along the retroperitoneum along the proximal to mid right ureteral course and likely sequela of the obstruction component of a superimposed infection not excluded.  Clinical correlation and urological evaluation recommended.    Multiple nonobstructive left renal caliceal stones.    Please see above for additional details.      Electronically signed by: Ramon Love DO  Date:    04/15/2018  Time:    12:15             Narrative:    EXAMINATION:  CT RENAL STONE STUDY ABD PELVIS WO    CLINICAL HISTORY:  Flank pain, stone disease suspected;    TECHNIQUE:  Low dose axial images, sagittal and coronal reformations were obtained from the lung bases to the pubic symphysis.  Contrast was not administered.    COMPARISON:  None    FINDINGS:  There is no consolidation within the visualized lungs.  There is a small hiatal hernia.  Liver, spleen and pancreas are grossly normal in size evaluation for solid organ lesion is limited by lack of contrast.  There are no calcified gallstones in the gallbladder by CT criteria.    The adrenal glands are within normal limits bilaterally.    There are several punctate nonobstructive left renal caliceal stones largest along the lower pole measuring 4 mm.  In addition there is a punctate questionable hepatic or renal cortical stone.    There is mild right hydroureteronephrosis with induration along the retroperitoneum along the course of the right ureter with a focal calcified stone in the distal right ureter at the UVJ measuring 3-4 mm.  Superimposed infection not excluded.    No signs of appendicitis with normal appendix identified.    No free intraperitoneal gas or fluid.  Uterus identified with indwelling IUD.    No free intraperitoneal gas.  No evidence for acute fracture or subluxation of the visualized spine.                                 MEDICATIONS/FLUIDS:  Medications   acetaminophen tablet 1,000 mg (1,000 mg Oral Given 4/15/18 1123)   fentaNYL injection 25 mcg (25 mcg Intravenous Given 4/15/18 1123)   ketorolac injection 9.999 mg (9.999 mg Intravenous Given 4/15/18 1212)   sodium chloride 0.9% bolus 1,000 mL (1,000 mLs Intravenous New Bag 4/15/18 1318)   fentaNYL injection 25 mcg (25 mcg Intravenous Given 4/15/18 1329)   prochlorperazine injection Soln 10 mg (10 mg Intravenous Given 4/15/18 1328)         MDM: 37 y.o. female with right sided flank and RUQ abdominal pain. Pt is uncomfortable in the room. Bedside ultrasound with no gallstones, negative Day, positive right mild hydro concerning for kidney stone. Will get UA, Upreg, CMP, CBC, CT kidney. Symptomatic control with fentanyl and tylenol pending pregnancy test. Pt is not nauseated at this time.     Bedside ultrasound: mild hydronephrosis on the right side     12:04 PM  Negative pregnancy test. UA with 3+ leukocytes and occult blood. CBC with normal white count and hemoglobin. CMP normal kidney function. CT kidney pending.      12:51 PM  Discussed with urology, they reviewed the CT kidney. They also reviewed the blood work and vitals and are not concerned for an infected stone at this time. She can follow up in clinic     Pt feels much better, pain 2/10. Nausea and vomiting have resolved. Will discharge with pain control, antiemetics, Flomax, and follow up with urology. The patient has been carefully educated about symptoms and conditions that should prompt immediate return to the ED for recheck or further evaluation. Told to return immediately for any new or worsening or progressive symptoms. In addition, patient told to return to the ED if they are unable to obtain adequate o/p follow-up as they have been directed. Patient expressed good undertanding of these instructions.       IMPRESSION:  1.) Kidney stone    Dispo: Discharge       Balbina Martin MD  04/15/18  [id number]

## 2018-04-15 NOTE — ED TRIAGE NOTES
Onset this am abdominal pain radiating from rt flank to rt lower abdomen. Having n/v.  Constant with fluctuating intensity. Denies dysuria or fever. Deneis hematuria.Denies hx of kidney stones

## 2018-04-15 NOTE — ED NOTES
LOC: The patient is awake and alert; oriented x 3 and speaking appropriately.  APPEARANCE: Patient resting comfortably, patient is clean and well groomed  SKIN: warm and dry, normal skin turgor & moist mucus membranes, skin intact, no breakdown noted.  MUSCULOSKELETAL: Patient moving all extremities well, no obvious swelling or deformities noted  RESPIRATORY: Airway is open and patent, breath sounds clear throughout all lung fields; respirations are spontaneous, normal effort and rate  CARDIAC: Patient has a normal rate, no peripheral edema noted, capillary refill < 3 seconds; No complaints of chest pain   ABDOMEN: Soft and  tender to palpation in rt lower abdomen radiating from rt flank, no distention noted. Bowel sounds present x 4, having n/v Onset this am and continuos.

## 2018-04-16 ENCOUNTER — TELEPHONE (OUTPATIENT)
Dept: OBSTETRICS AND GYNECOLOGY | Facility: CLINIC | Age: 38
End: 2018-04-16

## 2018-04-16 DIAGNOSIS — Z97.5 IUD (INTRAUTERINE DEVICE) IN PLACE: Primary | ICD-10-CM

## 2018-04-16 LAB
BACTERIA UR CULT: NORMAL
BACTERIA UR CULT: NORMAL

## 2018-04-18 ENCOUNTER — HOSPITAL ENCOUNTER (OUTPATIENT)
Dept: RADIOLOGY | Facility: OTHER | Age: 38
Discharge: HOME | End: 2018-04-18
Attending: OBSTETRICS & GYNECOLOGY
Payer: MEDICAID

## 2018-04-18 DIAGNOSIS — Z97.5 IUD (INTRAUTERINE DEVICE) IN PLACE: ICD-10-CM

## 2018-04-18 PROCEDURE — 76830 TRANSVAGINAL US NON-OB: CPT | Mod: 26,,, | Performed by: INTERNAL MEDICINE

## 2018-04-18 PROCEDURE — 76856 US EXAM PELVIC COMPLETE: CPT | Mod: 26,,, | Performed by: INTERNAL MEDICINE

## 2018-04-18 PROCEDURE — 76830 TRANSVAGINAL US NON-OB: CPT | Mod: TC

## 2018-04-23 ENCOUNTER — PROCEDURE VISIT (OUTPATIENT)
Dept: OBSTETRICS AND GYNECOLOGY | Facility: CLINIC | Age: 38
End: 2018-04-23
Payer: MEDICAID

## 2018-04-23 VITALS — HEIGHT: 69 IN | BODY MASS INDEX: 37.65 KG/M2 | WEIGHT: 254.19 LBS

## 2018-04-23 DIAGNOSIS — N92.1 BREAKTHROUGH BLEEDING ASSOCIATED WITH INTRAUTERINE DEVICE (IUD): Primary | ICD-10-CM

## 2018-04-23 DIAGNOSIS — Z97.5 BREAKTHROUGH BLEEDING ASSOCIATED WITH INTRAUTERINE DEVICE (IUD): Primary | ICD-10-CM

## 2018-04-23 PROCEDURE — 99213 OFFICE O/P EST LOW 20 MIN: CPT | Mod: S$PBB,,, | Performed by: OBSTETRICS & GYNECOLOGY

## 2018-04-23 RX ORDER — ESTRADIOL 2 MG/1
2 TABLET ORAL DAILY
Qty: 7 TABLET | Refills: 3 | Status: SHIPPED | OUTPATIENT
Start: 2018-04-23 | End: 2018-10-21

## 2018-04-23 NOTE — PROCEDURES
Procedures     HPI: Pt reports spotting between cycles. Has Mirena in place since 2/2017. Ultrasound done last week shows IUD to be in appropriate place in endometrial cavity.     ROS:  GENERAL: Feeling well overall. Denies fever or chills.   SKIN: Denies rash or lesions.   HEAD: Denies head injury or headache.   NODES: Denies enlarged lymph nodes.   CHEST: Denies chest pain or shortness of breath.   CARDIOVASCULAR: Denies palpitations or left sided chest pain.   ABDOMEN: No abdominal pain, constipation, diarrhea, nausea, vomiting or rectal bleeding.   URINARY: No dysuria, hematuria, or burning on urination.  REPRODUCTIVE: See HPI.   BREASTS: Denies pain, lumps, or nipple discharge.   HEMATOLOGIC: No easy bruisability or excessive bleeding.   MUSCULOSKELETAL: Denies joint pain or swelling.   NEUROLOGIC: Denies syncope or weakness.   PSYCHIATRIC: Denies depression, anxiety or mood swings.    PE:   APPEARANCE: Well nourished, well developed, Black or  female in no acute distress.  PELVIC: deferred.     Diagnosis:  1. Breakthrough bleeding associated with intrauterine device (IUD)        Plan:     Orders Placed This Encounter    estradiol (ESTRACE) 2 MG tablet     - normal side effect from Mirena. If it continues despite estradiol therapy, will consider swapping out with Paragard to prevent spotting and make cycles more predictable.     - since spotting has now stopped I told her to not start estradiol unless bleeding goes beyond 7 days.     Follow-up with me PRN lack of improvement.

## 2018-06-18 ENCOUNTER — TELEPHONE (OUTPATIENT)
Dept: OBSTETRICS AND GYNECOLOGY | Facility: CLINIC | Age: 38
End: 2018-06-18

## 2018-06-18 NOTE — TELEPHONE ENCOUNTER
----- Message from Sheridan Otero sent at 6/18/2018  4:36 PM CDT -----  Contact: self  Pt needing a call back, regarding an appt, pt can be reached at [phone]. The appt is to have her Merena removed and get on the pill.

## 2018-07-12 ENCOUNTER — PROCEDURE VISIT (OUTPATIENT)
Dept: OBSTETRICS AND GYNECOLOGY | Facility: CLINIC | Age: 38
End: 2018-07-12
Payer: MEDICAID

## 2018-07-12 VITALS
BODY MASS INDEX: 37.62 KG/M2 | WEIGHT: 254 LBS | DIASTOLIC BLOOD PRESSURE: 70 MMHG | HEIGHT: 69 IN | SYSTOLIC BLOOD PRESSURE: 116 MMHG

## 2018-07-12 DIAGNOSIS — Z30.432 ENCOUNTER FOR REMOVAL OF INTRAUTERINE CONTRACEPTIVE DEVICE: Primary | ICD-10-CM

## 2018-07-12 DIAGNOSIS — Z12.4 PAP SMEAR FOR CERVICAL CANCER SCREENING: ICD-10-CM

## 2018-07-12 PROCEDURE — 58301 REMOVE INTRAUTERINE DEVICE: CPT | Mod: PBBFAC | Performed by: OBSTETRICS & GYNECOLOGY

## 2018-07-12 PROCEDURE — 58301 REMOVE INTRAUTERINE DEVICE: CPT | Mod: S$PBB,,, | Performed by: OBSTETRICS & GYNECOLOGY

## 2018-07-12 PROCEDURE — 88175 CYTOPATH C/V AUTO FLUID REDO: CPT

## 2018-07-12 RX ORDER — NORETHINDRONE ACETATE AND ETHINYL ESTRADIOL .02; 1 MG/1; MG/1
1 TABLET ORAL DAILY
Qty: 90 TABLET | Refills: 3 | Status: SHIPPED | OUTPATIENT
Start: 2018-07-12 | End: 2019-06-24 | Stop reason: SDUPTHER

## 2018-07-12 RX ORDER — FLUCONAZOLE 150 MG/1
150 TABLET ORAL ONCE
Qty: 1 TABLET | Refills: 1 | Status: SHIPPED | OUTPATIENT
Start: 2018-07-12 | End: 2018-07-12

## 2018-07-12 NOTE — PROCEDURES
Procedures     DATE: July 12th, 2018    TIME: 11:35 AM    PROCEDURE:  Mirena removal    INDICATION: Cesario Barrios is a 37 y.o. female who presents for IUD removal secondary to bleeding.      PRE-IUD REMOVAL COUNSELING:  The patient was advised of minimal risks of bleeding and pain and she agrees to proceed.    PROCEDURE:  TIME OUT PERFORMED.  IUD strings were  visualized at the os. IUD removed with gentle traction.  The patient tolerated the procedure well.    COMPLICATIONS: None    PATIENT DISPOSITION: The patient tolerated the procedure well.    ASSESSMENT:  Contraceptive Management / Removal IUD. V25.0.    POST IUD REMOVAL COUNSELING:  Expect period-like flow to occur after Mirena IUD removal and periods to return to pre-IUD pattern.  Manage post IUD removal cramping with NSAIDs, Tylenol or Rx per [facility].    POST IUD REMOVAL CONTRACEPTION:  If planning pregnancy, patient instructed to begin prenatal vitamins.     Counseling lasted approximately 15 minutes and all her questions were answered.    FOLLOW-UP: With me for annual gyn exam or prn.    Jocy Tejada MD 07/12/2018 11:35 AM

## 2018-10-21 ENCOUNTER — HOSPITAL ENCOUNTER (EMERGENCY)
Facility: HOSPITAL | Age: 38
Discharge: HOME | End: 2018-10-21
Attending: EMERGENCY MEDICINE
Payer: MEDICAID

## 2018-10-21 VITALS
RESPIRATION RATE: 16 BRPM | DIASTOLIC BLOOD PRESSURE: 93 MMHG | WEIGHT: 220 LBS | HEART RATE: 93 BPM | TEMPERATURE: 98 F | HEIGHT: 69 IN | OXYGEN SATURATION: 100 % | BODY MASS INDEX: 32.58 KG/M2 | SYSTOLIC BLOOD PRESSURE: 144 MMHG

## 2018-10-21 DIAGNOSIS — J01.90 ACUTE SINUSITIS, RECURRENCE NOT SPECIFIED, UNSPECIFIED LOCATION: Primary | ICD-10-CM

## 2018-10-21 DIAGNOSIS — H93.8X1 CONGESTION OF RIGHT EAR: ICD-10-CM

## 2018-10-21 DIAGNOSIS — H92.01 OTALGIA OF RIGHT EAR: ICD-10-CM

## 2018-10-21 PROCEDURE — 96372 THER/PROPH/DIAG INJ SC/IM: CPT

## 2018-10-21 PROCEDURE — 25000003 PHARM REV CODE 250: Performed by: PHYSICIAN ASSISTANT

## 2018-10-21 PROCEDURE — 99284 EMERGENCY DEPT VISIT MOD MDM: CPT | Mod: 25

## 2018-10-21 PROCEDURE — 25000003 PHARM REV CODE 250: Performed by: EMERGENCY MEDICINE

## 2018-10-21 PROCEDURE — 99283 EMERGENCY DEPT VISIT LOW MDM: CPT | Mod: ,,, | Performed by: EMERGENCY MEDICINE

## 2018-10-21 PROCEDURE — 63600175 PHARM REV CODE 636 W HCPCS: Performed by: PHYSICIAN ASSISTANT

## 2018-10-21 RX ORDER — PSEUDOEPHEDRINE HYDROCHLORIDE 60 MG/1
60 TABLET ORAL EVERY 6 HOURS PRN
Qty: 30 TABLET | Refills: 0 | Status: SHIPPED | OUTPATIENT
Start: 2018-10-21 | End: 2018-10-31

## 2018-10-21 RX ORDER — NAPROXEN 500 MG/1
500 TABLET ORAL 2 TIMES DAILY WITH MEALS
Qty: 30 TABLET | Refills: 0 | Status: SHIPPED | OUTPATIENT
Start: 2018-10-21 | End: 2020-06-07 | Stop reason: SDUPTHER

## 2018-10-21 RX ORDER — KETOROLAC TROMETHAMINE 30 MG/ML
15 INJECTION, SOLUTION INTRAMUSCULAR; INTRAVENOUS
Status: COMPLETED | OUTPATIENT
Start: 2018-10-21 | End: 2018-10-21

## 2018-10-21 RX ORDER — ACETAMINOPHEN 500 MG
1000 TABLET ORAL
Status: COMPLETED | OUTPATIENT
Start: 2018-10-21 | End: 2018-10-21

## 2018-10-21 RX ORDER — PSEUDOEPHEDRINE HCL 120 MG/1
120 TABLET, FILM COATED, EXTENDED RELEASE ORAL
Status: COMPLETED | OUTPATIENT
Start: 2018-10-21 | End: 2018-10-21

## 2018-10-21 RX ORDER — AMOXICILLIN AND CLAVULANATE POTASSIUM 875; 125 MG/1; MG/1
1 TABLET, FILM COATED ORAL 2 TIMES DAILY
Qty: 14 TABLET | Refills: 0 | Status: SHIPPED | OUTPATIENT
Start: 2018-10-21 | End: 2022-09-14

## 2018-10-21 RX ORDER — AMOXICILLIN AND CLAVULANATE POTASSIUM 875; 125 MG/1; MG/1
1 TABLET, FILM COATED ORAL 2 TIMES DAILY
Qty: 14 TABLET | Refills: 0 | Status: SHIPPED | OUTPATIENT
Start: 2018-10-21 | End: 2018-10-21 | Stop reason: SDUPTHER

## 2018-10-21 RX ADMIN — KETOROLAC TROMETHAMINE 15 MG: 30 INJECTION, SOLUTION INTRAMUSCULAR at 09:10

## 2018-10-21 RX ADMIN — PSEUDOEPHEDRINE HYDROCHLORIDE 120 MG: 120 TABLET, FILM COATED ORAL at 10:10

## 2018-10-21 RX ADMIN — ACETAMINOPHEN 1000 MG: 500 TABLET ORAL at 09:10

## 2018-10-22 ENCOUNTER — HOSPITAL ENCOUNTER (EMERGENCY)
Facility: HOSPITAL | Age: 38
Discharge: HOME | End: 2018-10-22
Attending: EMERGENCY MEDICINE
Payer: MEDICAID

## 2018-10-22 VITALS
DIASTOLIC BLOOD PRESSURE: 91 MMHG | OXYGEN SATURATION: 96 % | SYSTOLIC BLOOD PRESSURE: 145 MMHG | BODY MASS INDEX: 32.58 KG/M2 | HEART RATE: 88 BPM | TEMPERATURE: 98 F | HEIGHT: 69 IN | WEIGHT: 220 LBS | RESPIRATION RATE: 16 BRPM

## 2018-10-22 DIAGNOSIS — H66.011 ACUTE SUPPURATIVE OTITIS MEDIA OF RIGHT EAR WITH SPONTANEOUS RUPTURE OF TYMPANIC MEMBRANE, RECURRENCE NOT SPECIFIED: Primary | ICD-10-CM

## 2018-10-22 PROCEDURE — 25000003 PHARM REV CODE 250: Performed by: NURSE PRACTITIONER

## 2018-10-22 PROCEDURE — 99283 EMERGENCY DEPT VISIT LOW MDM: CPT

## 2018-10-22 RX ORDER — CIPROFLOXACIN AND DEXAMETHASONE 3; 1 MG/ML; MG/ML
4 SUSPENSION/ DROPS AURICULAR (OTIC) 2 TIMES DAILY
Qty: 7.5 ML | Refills: 0 | Status: SHIPPED | OUTPATIENT
Start: 2018-10-22

## 2018-10-22 RX ORDER — CIPROFLOXACIN AND DEXAMETHASONE 3; 1 MG/ML; MG/ML
4 SUSPENSION/ DROPS AURICULAR (OTIC)
Status: COMPLETED | OUTPATIENT
Start: 2018-10-22 | End: 2018-10-22

## 2018-10-22 RX ADMIN — CIPROFLOXACIN AND DEXAMETHASONE 4 DROP: 3; 1 SUSPENSION/ DROPS AURICULAR (OTIC) at 11:10

## 2018-10-22 NOTE — ED PROVIDER NOTES
Encounter Date: 10/22/2018       History     Chief Complaint   Patient presents with    Otalgia     Seen in ED yesterday for right sided earache and given Naproxen and Augmentin.  Today has green drainage from the ear.      This is a 37 y.o. female complaining of right otalgia and fluid leaking from her ear. She was prescribed Augmentin yesterday for this same problem which she has been compliant with so far. Came to the ED today because of fluid drainage from the ear which was not present yesterday. Taking naproxen for pain and Sudafed for sinus congestion in addition to Augmentin. Denies fever, headache, N/V/D, sore throat, or any additional symptoms.          Review of patient's allergies indicates:  No Known Allergies  Past Medical History:   Diagnosis Date    Anemia     borderline     Benign essential hypertension antepartum 2014    UTI (lower urinary tract infection)      Past Surgical History:   Procedure Laterality Date    broken finger       SECTION      DELIVERY-CEASAREAN SECTION N/A 2014    Performed by [facility] at [facility]    ENCERCLAGE N/A 2016    Performed by [facility] at [facility]     Family History   Problem Relation Age of Onset    COPD Neg Hx     Drug abuse Neg Hx     Kidney disease Neg Hx     Learning disabilities Neg Hx     Mental illness Neg Hx     Breast cancer Neg Hx     Colon cancer Neg Hx     Ovarian cancer Neg Hx      Social History     Tobacco Use    Smoking status: Never Smoker    Smokeless tobacco: Never Used   Substance Use Topics    Alcohol use: No    Drug use: No     Review of Systems   Constitutional: Negative for chills, fatigue and fever.   HENT: Positive for ear pain (right). Negative for congestion, nosebleeds, postnasal drip, rhinorrhea, sinus pressure and sore throat.    Eyes: Negative for photophobia and pain.   Respiratory: Negative for apnea, cough, choking, chest tightness, shortness of breath, wheezing  and stridor.    Cardiovascular: Negative for chest pain, palpitations and leg swelling.   Gastrointestinal: Negative for abdominal pain, constipation, diarrhea, nausea and vomiting.   Genitourinary: Negative for dysuria, flank pain, hematuria, pelvic pain and vaginal discharge.   Musculoskeletal: Negative for arthralgias, back pain, gait problem and neck pain.   Skin: Negative for color change, pallor, rash and wound.   Neurological: Negative for dizziness, seizures, syncope, facial asymmetry, light-headedness and headaches.   Hematological: Negative for adenopathy.   Psychiatric/Behavioral: Negative for confusion. The patient is not nervous/anxious.        Physical Exam     Initial Vitals [10/22/18 1058]   BP Pulse Resp Temp SpO2   (!) 145/91 88 16 98.3 °F (36.8 °C) 96 %      MAP       --         Physical Exam    Nursing note and vitals reviewed.  Constitutional: She appears well-developed and well-nourished.   HENT:   Head: Normocephalic and atraumatic.   Right Ear: External ear normal. There is drainage. No mastoid tenderness. Tympanic membrane is perforated. Decreased hearing is noted.   Left Ear: Hearing, tympanic membrane, external ear and ear canal normal. No mastoid tenderness.   Nose: Nose normal.   Right TM perforated with yellow drainage in the external auditory canal. No mastoid swelling, erythema, or tenderness. Left TM and canal are normal. No posterior oropharyngeal abnormalities.   Eyes: Conjunctivae and EOM are normal. Right eye exhibits no discharge. Left eye exhibits no discharge.   Neck: Normal range of motion. Neck supple. No tracheal deviation present.   Cardiovascular: Normal rate.   Pulmonary/Chest: No stridor. No respiratory distress.   Abdominal: Soft. She exhibits no distension. There is no tenderness.   Musculoskeletal: Normal range of motion. She exhibits no tenderness.   Neurological: She is alert and oriented to person, place, and time. She has normal strength. No cranial nerve deficit  or sensory deficit.   Skin: Skin is warm and dry.   Psychiatric: She has a normal mood and affect. Her behavior is normal. Judgment and thought content normal.         ED Course   Procedures  Labs Reviewed - No data to display       Imaging Results    None          Medical Decision Making:   Differential Diagnosis:   Otitis media, otitis externa, mastoiditis, TM perforation, others  ED Management:  This is an emergent evaluation for patient complaining of ear pain.The history and the physical exam are consistent with otitis media with TM perforation.  I do not appreciate any signs of mastoiditis, foreign body, or systemic bacterial infection.  No evidence of infection in the external auditory canal.  Patient began Augmentin last night for OM. I will treat with Ciprodex and prescribe the same medication at discharge.    Advised patient to follow up with ENT for re-evaluation and further management.  ED return precautions given. All questions regarding diagnosis and plan were answered to the patient's fullest possible satisfaction. Patient expressed understanding of diagnosis, discharge instructions, and return precautions.    My attending physician was available for consultation on this case.                      Clinical Impression:   The encounter diagnosis was Acute suppurative otitis media of right ear with spontaneous rupture of tympanic membrane.      Disposition:   Disposition: Discharged  Condition: Stable                        Bernardo Crandall NP  10/22/18 [id number]

## 2018-10-22 NOTE — ED PROVIDER NOTES
Encounter Date: 10/21/2018       History     Chief Complaint   Patient presents with    Otalgia     Pt reports right ear pain, muffeled hearing for the last few hours. Pt also reports sinus pressure for the last few days and has taken sudafed. Pt denies fever, N/V/D.      37-year-old female with history of anemia presents for right-sided ear pain x2 hours.  Patient reports nasal congestion for several days followed by sudden popping and congestion of the right ear.  Denies any palliating or provoking factors, patient has not attempted any therapy.  Reports associated muffled hearing.  Denies ear discharge or bleeding, trauma to the ear, any foreign objects in the, fevers, sore throat, jaw pain or trouble swallowing.          Review of patient's allergies indicates:  No Known Allergies  Past Medical History:   Diagnosis Date    Anemia     borderline     Benign essential hypertension antepartum 2014    UTI (lower urinary tract infection)      Past Surgical History:   Procedure Laterality Date    broken finger       SECTION      DELIVERY-CEASAREAN SECTION N/A 2014    Performed by Jocy [facility]ada MD at [facility]    ENCERCLAGE N/A 2016    Performed by Jocy Tejada MD at [facility]     Social History     Tobacco Use    Smoking status: Never Smoker    Smokeless tobacco: Never Used   Substance Use Topics    Alcohol use: No    Drug use: No     Review of Systems   Constitutional: Negative for fatigue and fever.   HENT: Positive for congestion and ear pain. Negative for ear discharge, sinus pressure, sinus pain, sore throat and trouble swallowing.    Respiratory: Negative for cough and shortness of breath.    Cardiovascular: Negative for chest pain and palpitations.   Gastrointestinal: Negative for abdominal pain.   Genitourinary: Negative for dysuria.   Musculoskeletal: Negative for neck pain.   Skin: Negative for wound.   Allergic/Immunologic: Negative for immunocompromised state.    Neurological: Negative for dizziness and headaches.       Physical Exam     Initial Vitals [10/21/18 2043]   BP Pulse Resp Temp SpO2   (!) 144/93 93 16 98.4 °F (36.9 °C) 100 %      MAP       --         Physical Exam    Nursing note and vitals reviewed.  Constitutional: She appears well-developed and well-nourished. She is not diaphoretic. She appears distressed.   Patient holding ear   HENT:   Head: Normocephalic and atraumatic.   Right Ear: Hearing normal. No lacerations. There is tenderness. No drainage or swelling. No foreign bodies. No mastoid tenderness. Tympanic membrane is retracted. Tympanic membrane is not injected, not scarred, not perforated, not erythematous and not bulging. No middle ear effusion. No hemotympanum. No decreased hearing is noted.   Left Ear: Hearing, tympanic membrane, external ear and ear canal normal.   Nose: Right sinus exhibits no maxillary sinus tenderness and no frontal sinus tenderness. Left sinus exhibits no frontal sinus tenderness.   Mouth/Throat: Oropharynx is clear and moist.   Tenderness to palpation of right tragus.  Canal erythematous without edema, bleeding, discharge or foreign body.  TM appears retracted.   Eyes: EOM are normal. Pupils are equal, round, and reactive to light.   Neck: Normal range of motion. Neck supple.   Cardiovascular: Normal rate, regular rhythm, normal heart sounds and intact distal pulses.   Pulmonary/Chest: Breath sounds normal.   Musculoskeletal: Normal range of motion.   Neurological: She is alert and oriented to person, place, and time.   Skin: Skin is warm and dry.   Psychiatric: She has a normal mood and affect.         ED Course   Procedures  Labs Reviewed - No data to display       Imaging Results    None          Medical Decision Making:   History:   Old Medical Records: I decided to obtain old medical records.  Other:   I have discussed this case with another health care provider.       APC / Resident Notes:   37-year-old female  presenting with sudden onset of right-sided otalgia after several days of sinus congestion.  Stable vitals, TM appears retracted on exam. DDX includes eustachian tube dysfunction, sinus congestion.  Will give Tylenol, Toradol for pain, Sudafed for congestion reassess.    Patient feeling improvement of symptoms after therapy.  Repeat exam shows non retracted TM.  Suspect due to pressure changes from sneezing.  Given symptomatic improvement, I do not believe she requires further ED treatment is stable for discharge. Will discharge with naproxen for pain and pseudoephedrine for congestion.  Discussed the importance of follow-up, ED return precautions given.  I discussed this patient with my supervising physician.         Attending Attestation:     Physician Attestation Statement for NP/PA:   I have conducted a face to face encounter with this patient in addition to the NP/PA, due to NP/PA Request    Other NP/PA Attestation Additions:    History of Present Illness: 37-year-old female with acute-onset right otalgia.  Take another for pain.  No associated fevers or otorrhea.  Recently stopped taking Sudafed which had been taken for URI symptoms. URI symptoms occurring for 1 week which include thick, white purulent drainage.   Physical Exam: Grade, retracted right TM.  No pain with movement of pinna. Mild debris in right EOC. Thick purulent discharge from naris.      Medical Decision Making: Vitals normal. Afebrile.  Here with acute onset right ear pain.  Suspect this is from dysfunctional eustachian tube.  Encourage nose blowing in ED.  Also given Sudafed in ED.  This resolved symptoms.  Given duration of symptoms nasal drainage, we will treat for acute sinusitis with Augmentin.  Will also placements scheduled Sudafed. Stable for discharge at this time.  Return precautions discussed.                    Clinical Impression:   The primary encounter diagnosis was Acute sinusitis, recurrence not specified, unspecified  location. Diagnoses of Otalgia of right ear and Congestion of right ear were also pertinent to this visit.      Disposition:   Disposition: Discharged  Condition: Stable    Angelina Awan PA-C  10/22/18 0055       Angelina Awan PA-C  10/24/18 1900       Darryn Blanco MD  10/24/18 1940

## 2018-10-22 NOTE — DISCHARGE INSTRUCTIONS
Please schedule an appointment with your Primary Care Doctor. If your ear pain gets much worse, please come back to the Emergency Department.

## 2018-10-22 NOTE — ED TRIAGE NOTES
PT was seen in the ed and diagnosed with ear infection yesterday. This morning woke up with some drainage.

## 2018-10-22 NOTE — ED TRIAGE NOTES
Right ear pain x1 hour.  Denies injury, denies fever.  Reports sudden onset. Pt rocking, holding ear.  Reports recent sinus congestion last week.

## 2018-10-22 NOTE — ED TRIAGE NOTES
"Patient stated that she's having right ear pain since last night. "It popped" pain 0/10 "it feels stuffy"  Aching and sore in nature.  "

## 2018-10-22 NOTE — DISCHARGE INSTRUCTIONS
Use ear drops twice daily for 7 days.    Continue to take antibiotics until they are gone as prescribed. You should not have any pills left over.    Do not allow water to get into your right ear.    Follow up with ENT as discussed for further treatment.    Return to the emergency department for any new or worsening symptoms or as needed for any additional concerns.

## 2019-06-24 RX ORDER — NORETHINDRONE ACETATE AND ETHINYL ESTRADIOL .02; 1 MG/1; MG/1
TABLET ORAL
Qty: 84 TABLET | Refills: 3 | Status: SHIPPED | OUTPATIENT
Start: 2019-06-24

## 2020-06-06 ENCOUNTER — HOSPITAL ENCOUNTER (EMERGENCY)
Facility: HOSPITAL | Age: 40
Discharge: HOME | End: 2020-06-07
Attending: EMERGENCY MEDICINE

## 2020-06-06 VITALS
SYSTOLIC BLOOD PRESSURE: 147 MMHG | HEART RATE: 91 BPM | RESPIRATION RATE: 18 BRPM | DIASTOLIC BLOOD PRESSURE: 91 MMHG | OXYGEN SATURATION: 97 % | TEMPERATURE: 99 F

## 2020-06-06 DIAGNOSIS — M79.673 FOOT PAIN: ICD-10-CM

## 2020-06-06 DIAGNOSIS — S93.601A SPRAIN OF RIGHT FOOT, INITIAL ENCOUNTER: Primary | ICD-10-CM

## 2020-06-06 PROCEDURE — 99284 EMERGENCY DEPT VISIT MOD MDM: CPT | Mod: ,,, | Performed by: PHYSICIAN ASSISTANT

## 2020-06-06 PROCEDURE — 99284 PR EMERGENCY DEPT VISIT,LEVEL IV: ICD-10-PCS | Mod: ,,, | Performed by: PHYSICIAN ASSISTANT

## 2020-06-06 PROCEDURE — 99283 EMERGENCY DEPT VISIT LOW MDM: CPT

## 2020-06-07 PROCEDURE — 25000003 PHARM REV CODE 250: Performed by: PHYSICIAN ASSISTANT

## 2020-06-07 RX ORDER — NAPROXEN 500 MG/1
500 TABLET ORAL 2 TIMES DAILY WITH MEALS
Qty: 30 TABLET | Refills: 0 | Status: SHIPPED | OUTPATIENT
Start: 2020-06-07 | End: 2022-09-14

## 2020-06-07 RX ORDER — IBUPROFEN 600 MG/1
600 TABLET ORAL
Status: COMPLETED | OUTPATIENT
Start: 2020-06-07 | End: 2020-06-07

## 2020-06-07 RX ORDER — ACETAMINOPHEN 500 MG
1000 TABLET ORAL
Status: COMPLETED | OUTPATIENT
Start: 2020-06-07 | End: 2020-06-07

## 2020-06-07 RX ADMIN — IBUPROFEN 600 MG: 600 TABLET, FILM COATED ORAL at 01:06

## 2020-06-07 RX ADMIN — ACETAMINOPHEN 1000 MG: 500 TABLET ORAL at 01:06

## 2020-06-07 NOTE — ED PROVIDER NOTES
"Encounter Date: 2020       History     Chief Complaint   Patient presents with    Foot Injury     Reports 4/10 midfoot pain x1 day without injury. Patient noted swelling to foot and unable to bear weight secondary to pain.      39-year-old female with no applicable past medical history presents for pain and swelling in her right foot after twisting it earlier today.  She states that she stepped funny" now is having pain in her midfoot.  She can bear weight but it is painful she has have an trouble ambulating.  She denies any palliating factors, she did not take any medications prior to arrival.  She denies any ankle pain, pain in the contralateral foot, leg swelling, numbness/tingling weakness.        Review of patient's allergies indicates:  No Known Allergies  Past Medical History:   Diagnosis Date    Anemia     borderline     Benign essential hypertension antepartum 2014    UTI (lower urinary tract infection)      Past Surgical History:   Procedure Laterality Date    broken finger       SECTION       Family History   Problem Relation Age of Onset    COPD Neg Hx     Drug abuse Neg Hx     Kidney disease Neg Hx     Learning disabilities Neg Hx     Mental illness Neg Hx     Breast cancer Neg Hx     Colon cancer Neg Hx     Ovarian cancer Neg Hx      Social History     Tobacco Use    Smoking status: Never Smoker    Smokeless tobacco: Never Used   Substance Use Topics    Alcohol use: No    Drug use: No     Review of Systems   Constitutional: Negative for fever.   HENT: Negative for sore throat.    Respiratory: Negative for shortness of breath.    Cardiovascular: Negative for chest pain.   Gastrointestinal: Negative for nausea.   Genitourinary: Negative for dysuria.   Musculoskeletal: Positive for arthralgias and gait problem. Negative for back pain.   Skin: Negative for rash.   Neurological: Negative for weakness and numbness.   Hematological: Does not bruise/bleed easily. "       Physical Exam     Initial Vitals [06/06/20 2311]   BP Pulse Resp Temp SpO2   (!) 147/91 91 18 98.8 °F (37.1 °C) 97 %      MAP       --         Physical Exam    Nursing note and vitals reviewed.  Constitutional: She appears well-developed and well-nourished.   HENT:   Head: Normocephalic and atraumatic.   Eyes: EOM are normal. Pupils are equal, round, and reactive to light.   Neck: Normal range of motion. Neck supple.   Cardiovascular: Normal rate, regular rhythm, normal heart sounds and intact distal pulses. Exam reveals no gallop and no friction rub.    No murmur heard.  Pulmonary/Chest: Breath sounds normal. No respiratory distress. She has no wheezes. She has no rhonchi. She has no rales. She exhibits no tenderness.   Musculoskeletal: Normal range of motion.   Mild swelling overlying the dorsum of the right foot.  Tenderness to palpation of the midfoot.  No ankle tenderness   Neurological: She is alert and oriented to person, place, and time.   Skin: Skin is warm and dry.   Psychiatric: She has a normal mood and affect.         ED Course   Procedures  Labs Reviewed - No data to display       Imaging Results          X-Ray Foot Complete Right (Final result)  Result time 06/07/20 01:48:54    Final result by Ghanshyam Sidhu MD (06/07/20 01:48:54)                 Impression:      No acute fracture.      Electronically signed by: Ghanshyam Sidhu MD  Date:    06/07/2020  Time:    01:48             Narrative:    EXAMINATION:  XR FOOT COMPLETE 3 VIEW RIGHT    CLINICAL HISTORY:  . Pain in unspecified foot    TECHNIQUE:  AP, lateral, and oblique views of the right foot were performed.    COMPARISON:  None.    FINDINGS:  No fracture or dislocation.  Lisfranc articulation is congruent.  Cartilage spaces are maintained.  Mild soft tissue swelling over the dorsum of the foot/ankle.                                 Medical Decision Making:   History:   Old Medical Records: I decided to obtain old medical records.  Old  Records Summarized: records from clinic visits.       <> Summary of Records: Most recent ED visit in 2018 for sinusitis  Initial Assessment:   39-year-old female presenting for foot pain after twisting her foot.  She is mildly hypertensive otherwise normal vitals.  She has some swelling of the foot and some tenderness over the midfoot.  Differential Diagnosis:   Foot sprain  Lower suspicion for foot fracture  No ankle tenderness or pain to suggest ankle fracture  Arthritis      Clinical Tests:   Radiological Study: Ordered and Reviewed  ED Management:  Will give Motrin, Tylenol, do x-rays and reassess.    X-rays negative for acute fracture.  Will place patient walking boot and discharge with naproxen for pain.  Instructed to follow up with PCP. Stressed the importance of follow-up, strict ED return precautions given.  Patient voiced understanding and is comfortable with discharge.                                  Clinical Impression:       ICD-10-CM ICD-9-CM   1. Sprain of right foot, initial encounter S93.601A 845.10   2. Foot pain M79.673 729.5         Disposition:   Disposition: Discharged  Condition: Stable     ED Disposition Condition    Discharge Stable        ED Prescriptions     Medication Sig Dispense Start Date End Date Auth. Provider    naproxen (NAPROSYN) 500 MG tablet Take 1 tablet (500 mg total) by mouth 2 (two) times daily with meals. 30 tablet 6/7/2020  Angelina Awan PA-C        Follow-up Information     Follow up With Specialties Details Why Contact [facility] Family Medicine Schedule an appointment as soon as possible for a visit in 1 week  [address] [address]  [phone]      [facility] Emergency Medicine Go to  If symptoms worsen [facility] [facility] [address]  [phone]                                     Angelina Awan PA-C  06/07/20 6027

## 2020-06-07 NOTE — ED TRIAGE NOTES
Pt reports R foot pain and swelling that began around 1800 tonight after leaving work, pt states she thinks she may have stepped wrong and twisted it. Swelling noted to lateral side of R foot and tenderness to palpation, 2+ DP pulses. Pt rates pain 4/10 and states it is difficult to ambulate with pain. Denies OTC medications for pain.

## 2020-06-07 NOTE — DISCHARGE INSTRUCTIONS
Diagnosis:  Foot sprain    Your x-ray does not show any broken bones.  Your foot is sprained.  Treatment for this is to rest, ice, compress and elevate the foot.  I am prescribing an anti-inflammatory pain medicine that you can take as needed.    Please schedule a follow-up appointment with your primary care doctor.  If you start to have severe swelling or inability to move the foot please return to the emergency department.

## 2021-04-16 ENCOUNTER — PATIENT MESSAGE (OUTPATIENT)
Dept: RESEARCH | Facility: HOSPITAL | Age: 41
End: 2021-04-16

## 2022-09-14 ENCOUNTER — OFFICE VISIT (OUTPATIENT)
Dept: URGENT CARE | Facility: CLINIC | Age: 42
End: 2022-09-14
Payer: COMMERCIAL

## 2022-09-14 VITALS
WEIGHT: 220 LBS | TEMPERATURE: 99 F | SYSTOLIC BLOOD PRESSURE: 162 MMHG | OXYGEN SATURATION: 98 % | HEIGHT: 69 IN | RESPIRATION RATE: 16 BRPM | HEART RATE: 62 BPM | DIASTOLIC BLOOD PRESSURE: 98 MMHG | BODY MASS INDEX: 32.58 KG/M2

## 2022-09-14 DIAGNOSIS — K04.7 DENTAL ABSCESS: Primary | ICD-10-CM

## 2022-09-14 DIAGNOSIS — R03.0 ELEVATED BLOOD PRESSURE READING: ICD-10-CM

## 2022-09-14 PROCEDURE — 99203 OFFICE O/P NEW LOW 30 MIN: CPT | Mod: S$GLB,,,

## 2022-09-14 PROCEDURE — 99203 PR OFFICE/OUTPT VISIT, NEW, LEVL III, 30-44 MIN: ICD-10-PCS | Mod: S$GLB,,,

## 2022-09-14 RX ORDER — IBUPROFEN 800 MG/1
800 TABLET ORAL 3 TIMES DAILY
Qty: 30 TABLET | Refills: 0 | Status: SHIPPED | OUTPATIENT
Start: 2022-09-14

## 2022-09-14 RX ORDER — IBUPROFEN 800 MG/1
TABLET ORAL
COMMUNITY
End: 2022-09-14

## 2022-09-14 RX ORDER — IBUPROFEN 400 MG/1
TABLET ORAL
COMMUNITY
End: 2022-09-14

## 2022-09-14 RX ORDER — AMOXICILLIN AND CLAVULANATE POTASSIUM 875; 125 MG/1; MG/1
1 TABLET, FILM COATED ORAL EVERY 12 HOURS
Qty: 20 TABLET | Refills: 0 | Status: SHIPPED | OUTPATIENT
Start: 2022-09-14 | End: 2022-09-24

## 2022-09-14 NOTE — PATIENT INSTRUCTIONS
- Ibuprofen for pain  - Take antibiotics as prescribed.  - Keep appt with dentist    - Follow up with your PCP or specialty clinic as directed in the next 1-2 weeks if not improved or as needed.  You can call [phone] to schedule an appointment with the appropriate provider.    - Go to the ER or seek medical attention immediately if you develop new or worsening symptoms.    - You must understand that you have received an Urgent Care treatment only and that you may be released before all of your medical problems are known or treated.   - You, the patient, will arrange for follow up care as instructed.   - If your condition worsens or fails to improve we recommend that you receive another evaluation at the ER immediately or contact your PCP to discuss your concerns or return here.

## 2022-09-14 NOTE — PROGRESS NOTES
"Subjective:       Patient ID: Cesario Barrios is a 41 y.o. female.    Vitals:  height is 5' 9" (1.753 m) and weight is 99.8 kg (220 lb). Her temperature is 98.5 °F (36.9 °C). Her blood pressure is 162/98 (abnormal) and her pulse is 62. Her respiration is 16 and oxygen saturation is 98%.     Chief Complaint: Oral Pain    Patient is a 41-year-old female who is complaining of a dental abscess and headache x6 days.  Denies any fever, chills, body aches, sore throat, difficulty swallowing, shortness of breath, neck pain or stiffness, acute vision changes.  She has been taking ibuprofen without relief.  She does have dental appointment on 09/16/2022 but states that the pain has been getting worse and would like to be seen before then.    Dental Pain   This is a new problem. The current episode started in the past 7 days. The problem has been gradually worsening. The pain is at a severity of 8/10. Associated symptoms include facial pain. Pertinent negatives include no difficulty swallowing, fever, oral bleeding, sinus pressure or thermal sensitivity. She has tried acetaminophen and NSAIDs for the symptoms. The treatment provided mild relief.     Constitution: Negative for chills, sweating, fatigue and fever.   HENT:  Positive for dental problem. Negative for ear pain, ear discharge, congestion and sinus pressure.    Neck: Negative for neck pain and neck stiffness.   Cardiovascular:  Negative for chest pain.   Eyes:  Negative for eye pain, photophobia, vision loss, double vision and blurred vision.   Respiratory:  Negative for cough.    Gastrointestinal:  Negative for abdominal pain, nausea, vomiting and diarrhea.   Musculoskeletal:  Negative for muscle ache.   Skin:  Negative for rash.   Neurological:  Positive for headaches. Negative for dizziness.     Objective:      Physical Exam   Constitutional: She is oriented to person, place, and time. She appears well-developed.   HENT:   Head: Normocephalic and atraumatic. "   Ears:   Right Ear: Tympanic membrane, external ear and ear canal normal.   Left Ear: Tympanic membrane, external ear and ear canal normal.   Nose: Nose normal. No rhinorrhea or congestion.   Mouth/Throat: Uvula is midline and oropharynx is clear and moist. Mucous membranes are moist. No trismus in the jaw. Dental abscesses present. No uvula swelling. No posterior oropharyngeal erythema or tonsillar abscesses. Oropharynx is clear.       Eyes: Conjunctivae, EOM and lids are normal. Pupils are equal, round, and reactive to light.   Neck: Trachea normal and phonation normal. Neck supple.   Musculoskeletal: Normal range of motion.         General: Normal range of motion.   Neurological: She is alert and oriented to person, place, and time.   Skin: Skin is warm, dry and intact.   Psychiatric: Her speech is normal and behavior is normal. Judgment and thought content normal.   Nursing note and vitals reviewed.      Assessment:       1. Dental abscess    2. Elevated blood pressure reading          Plan:         Dental abscess  -     amoxicillin-clavulanate 875-125mg (AUGMENTIN) 875-125 mg per tablet; Take 1 tablet by mouth every 12 (twelve) hours. for 10 days  Dispense: 20 tablet; Refill: 0  -     ibuprofen (ADVIL,MOTRIN) 800 MG tablet; Take 1 tablet (800 mg total) by mouth 3 (three) times daily.  Dispense: 30 tablet; Refill: 0    Elevated blood pressure reading  -     Ambulatory referral/consult to Internal Medicine       F/up with dentist  Pt would like referral for PCP.          Patient Instructions   - Ibuprofen for pain  - Take antibiotics as prescribed.  - Keep appt with dentist    - Follow up with your PCP or specialty clinic as directed in the next 1-2 weeks if not improved or as needed.  You can call [phone] to schedule an appointment with the appropriate provider.    - Go to the ER or seek medical attention immediately if you develop new or worsening symptoms.    - You must understand that you have  received an Urgent Care treatment only and that you may be released before all of your medical problems are known or treated.   - You, the patient, will arrange for follow up care as instructed.   - If your condition worsens or fails to improve we recommend that you receive another evaluation at the ER immediately or contact your PCP to discuss your concerns or return here.      yes

## 2024-09-05 ENCOUNTER — OFFICE VISIT (OUTPATIENT)
Dept: URGENT CARE | Facility: CLINIC | Age: 44
End: 2024-09-05
Payer: COMMERCIAL

## 2024-09-05 VITALS
RESPIRATION RATE: 16 BRPM | HEIGHT: 69 IN | BODY MASS INDEX: 38.59 KG/M2 | DIASTOLIC BLOOD PRESSURE: 91 MMHG | SYSTOLIC BLOOD PRESSURE: 139 MMHG | TEMPERATURE: 98 F | WEIGHT: 260.56 LBS | HEART RATE: 77 BPM | OXYGEN SATURATION: 95 %

## 2024-09-05 DIAGNOSIS — K08.89 PAIN, DENTAL: Primary | ICD-10-CM

## 2024-09-05 DIAGNOSIS — Z76.89 ENCOUNTER TO ESTABLISH CARE: ICD-10-CM

## 2024-09-05 PROCEDURE — 99213 OFFICE O/P EST LOW 20 MIN: CPT | Mod: S$GLB,,,

## 2024-09-05 RX ORDER — IBUPROFEN 800 MG/1
800 TABLET ORAL 3 TIMES DAILY
Qty: 21 TABLET | Refills: 0 | Status: SHIPPED | OUTPATIENT
Start: 2024-09-05

## 2024-09-05 NOTE — PATIENT INSTRUCTIONS
Please return here or go to the Emergency Department for any concerns or worsening of condition.    Please take IBUPROFEN every 8 hours as needed for pain/inflammation, you may decrease to every 12 hour, or once daily, or discontinue as your symptoms improve.     You were given a referral to primary care (internal medicine), please call [phone] for scheduling and appointments.     Please follow up with your primary care doctor or specialist as needed.    If you  smoke, please stop smoking.

## 2024-09-05 NOTE — PROGRESS NOTES
"Subjective:      Patient ID: Cesario Barrios is a 43 y.o. female.    Vitals:  height is 5' 9" (1.753 m) and weight is 118.2 kg (260 lb 9.3 oz). Her oral temperature is 98.3 °F (36.8 °C). Her blood pressure is 139/91 (abnormal) and her pulse is 77. Her respiration is 16 and oxygen saturation is 95%.     Chief Complaint: Dental Pain    Pt states she has been having a toothache since yesterday.     Provider note starts here:     42 yo female with no significant PMH. Primary concerns for today's visit is tooth pain. Patient states that they also reports sensitivity to the tooth. Patient states that eating worsens symptoms and tylenol/ibuprofen alleviates symptoms. Patient denies fever, chills, drainage. Patient states that she would like a referral to for primary care.    Dental Pain   The dental pain is located in She's tooth (teeth). This is a new problem. The current episode started yesterday. The problem has been unchanged. The pain is at a severity of 6/10. The pain is moderate. Associated symptoms include facial pain and thermal sensitivity. Pertinent negatives include no fever. She has tried acetaminophen for the symptoms. The treatment provided no relief.       Constitution: Negative for chills and fever.   HENT:  Positive for dental problem. Negative for drooling, mouth sores, tongue pain, tongue lesion and facial swelling.       Objective:     Physical Exam   Constitutional:  Non-toxic appearance. She does not appear ill. No distress.      Comments:Patient is in no acute distress, patient is non-toxic appearing, patient is ox3, patient is answering question appropriately.     HENT:   Mouth/Throat: Uvula is midline, oropharynx is clear and moist and mucous membranes are normal. Mucous membranes are moist. She does not have dentures. No oral lesions. No trismus in the jaw. Dental caries present. No dental abscesses, uvula swelling or lacerations. No oropharyngeal exudate, posterior oropharyngeal edema, " posterior oropharyngeal erythema, tonsillar abscesses or cobblestoning. Tonsils are 1+ on the right. Tonsils are 1+ on the left. No tonsillar exudate. Oropharynx is clear.          Comments: No drooling, no tripoding. Patient is able to handle secretions. Patient appears to be in no acute respiratory distress. Airway is intact. Patient is able to talk in complete sentences without discomfort.  There is no swelling, erythema, fluctuance or drainage on exam. I can no appreciated an abscess or cavity. There is no tenderness to palpation. Red represents area of suspected cavity.      Comments: There is no swelling, erythema, fluctuance or drainage on exam. I can no appreciated an abscess or cavity. There is no tenderness to palpation. Red represents area of suspected cavity.  Abdominal: Normal appearance.   Neurological: She is alert.   Skin: Skin is not diaphoretic.   Nursing note and vitals reviewed.    Assessment:     1. Pain, dental    2. Encounter to establish care      Plan:   Previous notes reviewed.  Vital signs reviewed.  Discussed dental pain, home care, tx options, and given follow up precautions.  Patient was briefed on my thought process and diagnosis.   Patient involved with the treatment plan and agreed to the plan.  Patient informed on warning signs, patient understood warning signs and to go to urgent care or ER if warning signs appear.  Please excuse grammatical/spelling errors appreciated throughout this visit encounter for a remote dictation device was used during this encounter.    Patient Instructions   Please return here or go to the Emergency Department for any concerns or worsening of condition.    Please take IBUPROFEN every 8 hours as needed for pain/inflammation, you may decrease to every 12 hour, or once daily, or discontinue as your symptoms improve.     You were given a referral to primary care (internal medicine), please call [phone] for scheduling and appointments.     Please follow  up with your primary care doctor or specialist as needed.    If you  smoke, please stop smoking.    Pain, dental  -     ibuprofen (ADVIL,MOTRIN) 800 MG tablet; Take 1 tablet (800 mg total) by mouth 3 (three) times daily.  Dispense: 21 tablet; Refill: 0    Encounter to establish care  -     Ambulatory referral/consult to Internal Medicine      Omar Campa PA-C
